# Patient Record
Sex: MALE | Race: BLACK OR AFRICAN AMERICAN | HISPANIC OR LATINO | Employment: FULL TIME | URBAN - METROPOLITAN AREA
[De-identification: names, ages, dates, MRNs, and addresses within clinical notes are randomized per-mention and may not be internally consistent; named-entity substitution may affect disease eponyms.]

---

## 2023-11-08 ENCOUNTER — HOSPITAL ENCOUNTER (INPATIENT)
Facility: HOSPITAL | Age: 43
LOS: 1 days | Discharge: ANOTHER HOSPITAL | DRG: 004 | End: 2023-11-08
Attending: EMERGENCY MEDICINE | Admitting: INTERNAL MEDICINE
Payer: COMMERCIAL

## 2023-11-08 ENCOUNTER — HOSPITAL ENCOUNTER (INPATIENT)
Facility: HOSPITAL | Age: 43
LOS: 4 days | Discharge: OTHER INSTITUTION | DRG: 915 | End: 2023-11-12
Attending: STUDENT IN AN ORGANIZED HEALTH CARE EDUCATION/TRAINING PROGRAM | Admitting: INTERNAL MEDICINE
Payer: COMMERCIAL

## 2023-11-08 ENCOUNTER — APPOINTMENT (OUTPATIENT)
Dept: RADIOLOGY | Facility: HOSPITAL | Age: 43
DRG: 915 | End: 2023-11-08
Payer: COMMERCIAL

## 2023-11-08 ENCOUNTER — ANESTHESIA EVENT (OUTPATIENT)
Dept: OPERATING ROOM | Facility: HOSPITAL | Age: 43
DRG: 915 | End: 2023-11-08
Payer: COMMERCIAL

## 2023-11-08 ENCOUNTER — APPOINTMENT (OUTPATIENT)
Dept: RADIOLOGY | Facility: HOSPITAL | Age: 43
DRG: 004 | End: 2023-11-08
Payer: COMMERCIAL

## 2023-11-08 ENCOUNTER — HOSPITAL ENCOUNTER (OUTPATIENT)
Dept: CARDIOLOGY | Facility: HOSPITAL | Age: 43
Discharge: HOME | End: 2023-11-08
Payer: COMMERCIAL

## 2023-11-08 ENCOUNTER — ANESTHESIA (OUTPATIENT)
Dept: OPERATING ROOM | Facility: HOSPITAL | Age: 43
DRG: 915 | End: 2023-11-08
Payer: COMMERCIAL

## 2023-11-08 VITALS
HEIGHT: 72 IN | OXYGEN SATURATION: 99 % | TEMPERATURE: 96.8 F | SYSTOLIC BLOOD PRESSURE: 137 MMHG | HEART RATE: 116 BPM | DIASTOLIC BLOOD PRESSURE: 77 MMHG | WEIGHT: 227.51 LBS | BODY MASS INDEX: 30.82 KG/M2 | RESPIRATION RATE: 20 BRPM

## 2023-11-08 DIAGNOSIS — J96.02 ACUTE RESPIRATORY FAILURE WITH HYPOXIA AND HYPERCAPNIA (MULTI): Primary | ICD-10-CM

## 2023-11-08 DIAGNOSIS — T78.3XXA ANGIOEDEMA, INITIAL ENCOUNTER: Primary | ICD-10-CM

## 2023-11-08 DIAGNOSIS — J96.01 ACUTE RESPIRATORY FAILURE WITH HYPOXIA AND HYPERCAPNIA (MULTI): Primary | ICD-10-CM

## 2023-11-08 DIAGNOSIS — T78.3XXA ANGIOEDEMA, INITIAL ENCOUNTER: ICD-10-CM

## 2023-11-08 DIAGNOSIS — I10 PRIMARY HYPERTENSION: ICD-10-CM

## 2023-11-08 PROBLEM — J69.0 ASPIRATION PNEUMONIA (MULTI): Status: ACTIVE | Noted: 2023-11-08

## 2023-11-08 PROBLEM — E87.29 RESPIRATORY ACIDOSIS: Status: ACTIVE | Noted: 2023-11-08

## 2023-11-08 LAB
ABO GROUP (TYPE) IN BLOOD: NORMAL
ABO GROUP (TYPE) IN BLOOD: NORMAL
ALBUMIN SERPL BCP-MCNC: 3.9 G/DL (ref 3.4–5)
ALBUMIN SERPL BCP-MCNC: 4 G/DL (ref 3.4–5)
ALBUMIN SERPL BCP-MCNC: 4.1 G/DL (ref 3.4–5)
ALBUMIN SERPL BCP-MCNC: 4.3 G/DL (ref 3.4–5)
ALP SERPL-CCNC: 41 U/L (ref 33–120)
ALP SERPL-CCNC: 44 U/L (ref 33–120)
ALT SERPL W P-5'-P-CCNC: 22 U/L (ref 10–52)
ALT SERPL W P-5'-P-CCNC: 23 U/L (ref 10–52)
ANION GAP BLDV CALCULATED.4IONS-SCNC: 6 MMOL/L (ref 10–25)
ANION GAP BLDV CALCULATED.4IONS-SCNC: 6 MMOL/L (ref 10–25)
ANION GAP SERPL CALC-SCNC: 13 MMOL/L (ref 10–20)
ANION GAP SERPL CALC-SCNC: 15 MMOL/L (ref 10–20)
ANION GAP SERPL CALC-SCNC: 16 MMOL/L (ref 10–20)
ANION GAP SERPL CALC-SCNC: 9 MMOL/L (ref 10–20)
ANTIBODY SCREEN: NORMAL
ANTIBODY SCREEN: NORMAL
APTT PPP: 26 SECONDS (ref 27–38)
AST SERPL W P-5'-P-CCNC: 22 U/L (ref 9–39)
AST SERPL W P-5'-P-CCNC: 22 U/L (ref 9–39)
BASE EXCESS BLDA CALC-SCNC: 1.6 MMOL/L (ref -2–3)
BASE EXCESS BLDV CALC-SCNC: -1.7 MMOL/L (ref -2–3)
BASE EXCESS BLDV CALC-SCNC: 0.1 MMOL/L (ref -2–3)
BASOPHILS # BLD AUTO: 0.03 X10*3/UL (ref 0–0.1)
BASOPHILS NFR BLD AUTO: 0.2 %
BILIRUB SERPL-MCNC: 0.5 MG/DL (ref 0–1.2)
BILIRUB SERPL-MCNC: 0.5 MG/DL (ref 0–1.2)
BODY TEMPERATURE: 37 DEGREES CELSIUS
BUN SERPL-MCNC: 16 MG/DL (ref 6–23)
BUN SERPL-MCNC: 16 MG/DL (ref 6–23)
BUN SERPL-MCNC: 17 MG/DL (ref 6–23)
BUN SERPL-MCNC: 18 MG/DL (ref 6–23)
CA-I BLDV-SCNC: 1.09 MMOL/L (ref 1.1–1.33)
CA-I BLDV-SCNC: 1.11 MMOL/L (ref 1.1–1.33)
CALCIUM SERPL-MCNC: 8.5 MG/DL (ref 8.6–10.6)
CALCIUM SERPL-MCNC: 8.5 MG/DL (ref 8.6–10.6)
CALCIUM SERPL-MCNC: 8.6 MG/DL (ref 8.6–10.3)
CALCIUM SERPL-MCNC: 8.6 MG/DL (ref 8.6–10.6)
CHLORIDE BLDV-SCNC: 104 MMOL/L (ref 98–107)
CHLORIDE BLDV-SCNC: 104 MMOL/L (ref 98–107)
CHLORIDE SERPL-SCNC: 104 MMOL/L (ref 98–107)
CHLORIDE SERPL-SCNC: 105 MMOL/L (ref 98–107)
CHOLEST SERPL-MCNC: 200 MG/DL (ref 0–199)
CHOLESTEROL/HDL RATIO: 3.3
CK MB CFR SERPL CALC: 1 %MB OF CK
CK MB SERPL-CCNC: 3.2 NG/ML
CK SERPL-CCNC: 277 U/L (ref 0–325)
CO2 SERPL-SCNC: 24 MMOL/L (ref 21–32)
CO2 SERPL-SCNC: 25 MMOL/L (ref 21–32)
CO2 SERPL-SCNC: 27 MMOL/L (ref 21–32)
CO2 SERPL-SCNC: 27 MMOL/L (ref 21–32)
CREAT SERPL-MCNC: 0.88 MG/DL (ref 0.5–1.3)
CREAT SERPL-MCNC: 1 MG/DL (ref 0.5–1.3)
CREAT SERPL-MCNC: 1.01 MG/DL (ref 0.5–1.3)
CREAT SERPL-MCNC: 1.13 MG/DL (ref 0.5–1.3)
EOSINOPHIL # BLD AUTO: 0.01 X10*3/UL (ref 0–0.7)
EOSINOPHIL NFR BLD AUTO: 0.1 %
ERYTHROCYTE [DISTWIDTH] IN BLOOD BY AUTOMATED COUNT: 13.2 % (ref 11.5–14.5)
ERYTHROCYTE [DISTWIDTH] IN BLOOD BY AUTOMATED COUNT: 13.7 % (ref 11.5–14.5)
EST. AVERAGE GLUCOSE BLD GHB EST-MCNC: 108 MG/DL
GFR SERPL CREATININE-BSD FRML MDRD: 84 ML/MIN/1.73M*2
GFR SERPL CREATININE-BSD FRML MDRD: >90 ML/MIN/1.73M*2
GLUCOSE BLD MANUAL STRIP-MCNC: 126 MG/DL (ref 74–99)
GLUCOSE BLD MANUAL STRIP-MCNC: 138 MG/DL (ref 74–99)
GLUCOSE BLD MANUAL STRIP-MCNC: 144 MG/DL (ref 74–99)
GLUCOSE BLDV-MCNC: 145 MG/DL (ref 74–99)
GLUCOSE BLDV-MCNC: 176 MG/DL (ref 74–99)
GLUCOSE SERPL-MCNC: 111 MG/DL (ref 74–99)
GLUCOSE SERPL-MCNC: 114 MG/DL (ref 74–99)
GLUCOSE SERPL-MCNC: 118 MG/DL (ref 74–99)
GLUCOSE SERPL-MCNC: 139 MG/DL (ref 74–99)
HBA1C MFR BLD: 5.4 %
HCO3 BLDA-SCNC: 29.2 MMOL/L (ref 22–26)
HCO3 BLDV-SCNC: 27.3 MMOL/L (ref 22–26)
HCO3 BLDV-SCNC: 29.5 MMOL/L (ref 22–26)
HCT VFR BLD AUTO: 40.2 % (ref 41–52)
HCT VFR BLD AUTO: 41.8 % (ref 41–52)
HCT VFR BLD EST: 46 % (ref 41–52)
HCT VFR BLD EST: 47 % (ref 41–52)
HDLC SERPL-MCNC: 60 MG/DL
HGB BLD-MCNC: 14.5 G/DL (ref 13.5–17.5)
HGB BLD-MCNC: 15.3 G/DL (ref 13.5–17.5)
HGB BLDV-MCNC: 15.2 G/DL (ref 13.5–17.5)
HGB BLDV-MCNC: 15.7 G/DL (ref 13.5–17.5)
IMM GRANULOCYTES # BLD AUTO: 0.07 X10*3/UL (ref 0–0.7)
IMM GRANULOCYTES NFR BLD AUTO: 0.4 % (ref 0–0.9)
INHALED O2 CONCENTRATION: 100 %
INHALED O2 CONCENTRATION: 100 %
INHALED O2 CONCENTRATION: 60 %
INR PPP: 1 (ref 0.9–1.1)
LACTATE BLDV-SCNC: 1.1 MMOL/L (ref 0.4–2)
LACTATE BLDV-SCNC: 2.1 MMOL/L (ref 0.4–2)
LDLC SERPL CALC-MCNC: 130 MG/DL
LIPASE SERPL-CCNC: 37 U/L (ref 9–82)
LYMPHOCYTES # BLD AUTO: 0.78 X10*3/UL (ref 1.2–4.8)
LYMPHOCYTES NFR BLD AUTO: 4.7 %
MAGNESIUM SERPL-MCNC: 2.12 MG/DL (ref 1.6–2.4)
MCH RBC QN AUTO: 29.4 PG (ref 26–34)
MCH RBC QN AUTO: 29.9 PG (ref 26–34)
MCHC RBC AUTO-ENTMCNC: 36.1 G/DL (ref 32–36)
MCHC RBC AUTO-ENTMCNC: 36.6 G/DL (ref 32–36)
MCV RBC AUTO: 81 FL (ref 80–100)
MCV RBC AUTO: 82 FL (ref 80–100)
MONOCYTES # BLD AUTO: 0.17 X10*3/UL (ref 0.1–1)
MONOCYTES NFR BLD AUTO: 1 %
NEUTROPHILS # BLD AUTO: 15.51 X10*3/UL (ref 1.2–7.7)
NEUTROPHILS NFR BLD AUTO: 93.6 %
NON HDL CHOLESTEROL: 140 MG/DL (ref 0–149)
NRBC BLD-RTO: 0 /100 WBCS (ref 0–0)
NRBC BLD-RTO: 0 /100 WBCS (ref 0–0)
OXYHGB MFR BLDA: 96.6 % (ref 94–98)
OXYHGB MFR BLDV: 87.3 % (ref 45–75)
OXYHGB MFR BLDV: 93.6 % (ref 45–75)
PCO2 BLDA: 58 MM HG (ref 38–42)
PCO2 BLDV: 53 MM HG (ref 41–51)
PCO2 BLDV: 81 MM HG (ref 41–51)
PH BLDA: 7.31 PH (ref 7.38–7.42)
PH BLDV: 7.17 PH (ref 7.33–7.43)
PH BLDV: 7.32 PH (ref 7.33–7.43)
PHOSPHATE SERPL-MCNC: 3.8 MG/DL (ref 2.5–4.9)
PHOSPHATE SERPL-MCNC: 4.5 MG/DL (ref 2.5–4.9)
PLATELET # BLD AUTO: 240 X10*3/UL (ref 150–450)
PLATELET # BLD AUTO: 255 X10*3/UL (ref 150–450)
PO2 BLDA: 330 MM HG (ref 85–95)
PO2 BLDV: 123 MM HG (ref 35–45)
PO2 BLDV: 62 MM HG (ref 35–45)
POTASSIUM BLDV-SCNC: 4.3 MMOL/L (ref 3.5–5.3)
POTASSIUM BLDV-SCNC: 5.7 MMOL/L (ref 3.5–5.3)
POTASSIUM SERPL-SCNC: 3.6 MMOL/L (ref 3.5–5.3)
POTASSIUM SERPL-SCNC: 5.2 MMOL/L (ref 3.5–5.3)
POTASSIUM SERPL-SCNC: 5.6 MMOL/L (ref 3.5–5.3)
POTASSIUM SERPL-SCNC: 5.7 MMOL/L (ref 3.5–5.3)
PROT SERPL-MCNC: 6.9 G/DL (ref 6.4–8.2)
PROT SERPL-MCNC: 6.9 G/DL (ref 6.4–8.2)
PROTHROMBIN TIME: 11.2 SECONDS (ref 9.8–12.8)
RBC # BLD AUTO: 4.94 X10*6/UL (ref 4.5–5.9)
RBC # BLD AUTO: 5.11 X10*6/UL (ref 4.5–5.9)
RH FACTOR (ANTIGEN D): NORMAL
RH FACTOR (ANTIGEN D): NORMAL
SAO2 % BLDA: 100 % (ref 94–100)
SAO2 % BLDV: 90 % (ref 45–75)
SAO2 % BLDV: 95 % (ref 45–75)
SODIUM BLDV-SCNC: 132 MMOL/L (ref 136–145)
SODIUM BLDV-SCNC: 135 MMOL/L (ref 136–145)
SODIUM SERPL-SCNC: 137 MMOL/L (ref 136–145)
SODIUM SERPL-SCNC: 138 MMOL/L (ref 136–145)
SODIUM SERPL-SCNC: 138 MMOL/L (ref 136–145)
SODIUM SERPL-SCNC: 139 MMOL/L (ref 136–145)
TRIGL SERPL-MCNC: 49 MG/DL (ref 0–149)
TSH SERPL-ACNC: 0.72 MIU/L (ref 0.44–3.98)
VLDL: 10 MG/DL (ref 0–40)
WBC # BLD AUTO: 11.4 X10*3/UL (ref 4.4–11.3)
WBC # BLD AUTO: 16.6 X10*3/UL (ref 4.4–11.3)

## 2023-11-08 PROCEDURE — 36415 COLL VENOUS BLD VENIPUNCTURE: CPT | Performed by: EMERGENCY MEDICINE

## 2023-11-08 PROCEDURE — 2500000004 HC RX 250 GENERAL PHARMACY W/ HCPCS (ALT 636 FOR OP/ED): Performed by: EMERGENCY MEDICINE

## 2023-11-08 PROCEDURE — 3700000001 HC GENERAL ANESTHESIA TIME - INITIAL BASE CHARGE: Performed by: OTOLARYNGOLOGY

## 2023-11-08 PROCEDURE — A31623 PR BRONCHOSCOPY,DIAGNOSTIC W BRUSH: Performed by: ANESTHESIOLOGY

## 2023-11-08 PROCEDURE — 94681 O2 UPTK CO2 OUTP % O2 XTRC: CPT

## 2023-11-08 PROCEDURE — 93010 ELECTROCARDIOGRAM REPORT: CPT | Performed by: INTERNAL MEDICINE

## 2023-11-08 PROCEDURE — 71045 X-RAY EXAM CHEST 1 VIEW: CPT | Performed by: RADIOLOGY

## 2023-11-08 PROCEDURE — 86161 COMPLEMENT/FUNCTION ACTIVITY: CPT | Performed by: STUDENT IN AN ORGANIZED HEALTH CARE EDUCATION/TRAINING PROGRAM

## 2023-11-08 PROCEDURE — 84132 ASSAY OF SERUM POTASSIUM: CPT | Performed by: STUDENT IN AN ORGANIZED HEALTH CARE EDUCATION/TRAINING PROGRAM

## 2023-11-08 PROCEDURE — 99239 HOSP IP/OBS DSCHRG MGMT >30: CPT | Performed by: INTERNAL MEDICINE

## 2023-11-08 PROCEDURE — 2500000005 HC RX 250 GENERAL PHARMACY W/O HCPCS

## 2023-11-08 PROCEDURE — 2500000005 HC RX 250 GENERAL PHARMACY W/O HCPCS: Performed by: INTERNAL MEDICINE

## 2023-11-08 PROCEDURE — 99140 ANES COMP EMERGENCY COND: CPT | Performed by: ANESTHESIOLOGY

## 2023-11-08 PROCEDURE — 71045 X-RAY EXAM CHEST 1 VIEW: CPT

## 2023-11-08 PROCEDURE — 2020000001 HC ICU ROOM DAILY

## 2023-11-08 PROCEDURE — 0BP1XFZ REMOVAL OF TRACHEOSTOMY DEVICE FROM TRACHEA, EXTERNAL APPROACH: ICD-10-PCS | Performed by: EMERGENCY MEDICINE

## 2023-11-08 PROCEDURE — 3700000002 HC GENERAL ANESTHESIA TIME - EACH INCREMENTAL 1 MINUTE: Performed by: OTOLARYNGOLOGY

## 2023-11-08 PROCEDURE — 82947 ASSAY GLUCOSE BLOOD QUANT: CPT | Performed by: STUDENT IN AN ORGANIZED HEALTH CARE EDUCATION/TRAINING PROGRAM

## 2023-11-08 PROCEDURE — 31605 EMER TRACHEOSTOMY CTHYR MEM: CPT | Performed by: OTOLARYNGOLOGY

## 2023-11-08 PROCEDURE — 86162 COMPLEMENT TOTAL (CH50): CPT | Performed by: STUDENT IN AN ORGANIZED HEALTH CARE EDUCATION/TRAINING PROGRAM

## 2023-11-08 PROCEDURE — 83735 ASSAY OF MAGNESIUM: CPT | Performed by: STUDENT IN AN ORGANIZED HEALTH CARE EDUCATION/TRAINING PROGRAM

## 2023-11-08 PROCEDURE — 5A1935Z RESPIRATORY VENTILATION, LESS THAN 24 CONSECUTIVE HOURS: ICD-10-PCS | Performed by: EMERGENCY MEDICINE

## 2023-11-08 PROCEDURE — 85025 COMPLETE CBC W/AUTO DIFF WBC: CPT | Performed by: STUDENT IN AN ORGANIZED HEALTH CARE EDUCATION/TRAINING PROGRAM

## 2023-11-08 PROCEDURE — 2500000004 HC RX 250 GENERAL PHARMACY W/ HCPCS (ALT 636 FOR OP/ED): Performed by: INTERNAL MEDICINE

## 2023-11-08 PROCEDURE — 0BH17EZ INSERTION OF ENDOTRACHEAL AIRWAY INTO TRACHEA, VIA NATURAL OR ARTIFICIAL OPENING: ICD-10-PCS | Performed by: EMERGENCY MEDICINE

## 2023-11-08 PROCEDURE — 87899 AGENT NOS ASSAY W/OPTIC: CPT | Mod: PORLAB | Performed by: INTERNAL MEDICINE

## 2023-11-08 PROCEDURE — 80061 LIPID PANEL: CPT

## 2023-11-08 PROCEDURE — 2500000005 HC RX 250 GENERAL PHARMACY W/O HCPCS: Performed by: NURSE ANESTHETIST, CERTIFIED REGISTERED

## 2023-11-08 PROCEDURE — 94002 VENT MGMT INPAT INIT DAY: CPT

## 2023-11-08 PROCEDURE — 85730 THROMBOPLASTIN TIME PARTIAL: CPT | Performed by: STUDENT IN AN ORGANIZED HEALTH CARE EDUCATION/TRAINING PROGRAM

## 2023-11-08 PROCEDURE — 84132 ASSAY OF SERUM POTASSIUM: CPT

## 2023-11-08 PROCEDURE — 84443 ASSAY THYROID STIM HORMONE: CPT

## 2023-11-08 PROCEDURE — 71045 X-RAY EXAM CHEST 1 VIEW: CPT | Mod: FY

## 2023-11-08 PROCEDURE — 96372 THER/PROPH/DIAG INJ SC/IM: CPT | Performed by: STUDENT IN AN ORGANIZED HEALTH CARE EDUCATION/TRAINING PROGRAM

## 2023-11-08 PROCEDURE — 84132 ASSAY OF SERUM POTASSIUM: CPT | Performed by: EMERGENCY MEDICINE

## 2023-11-08 PROCEDURE — 99222 1ST HOSP IP/OBS MODERATE 55: CPT

## 2023-11-08 PROCEDURE — 5A1945Z RESPIRATORY VENTILATION, 24-96 CONSECUTIVE HOURS: ICD-10-PCS | Performed by: OTOLARYNGOLOGY

## 2023-11-08 PROCEDURE — 83036 HEMOGLOBIN GLYCOSYLATED A1C: CPT

## 2023-11-08 PROCEDURE — 83690 ASSAY OF LIPASE: CPT

## 2023-11-08 PROCEDURE — 2500000004 HC RX 250 GENERAL PHARMACY W/ HCPCS (ALT 636 FOR OP/ED)

## 2023-11-08 PROCEDURE — 31622 DX BRONCHOSCOPE/WASH: CPT | Performed by: OTOLARYNGOLOGY

## 2023-11-08 PROCEDURE — 87449 NOS EACH ORGANISM AG IA: CPT | Mod: PORLAB | Performed by: INTERNAL MEDICINE

## 2023-11-08 PROCEDURE — 99291 CRITICAL CARE FIRST HOUR: CPT | Performed by: EMERGENCY MEDICINE

## 2023-11-08 PROCEDURE — A31623 PR BRONCHOSCOPY,DIAGNOSTIC W BRUSH: Performed by: NURSE ANESTHETIST, CERTIFIED REGISTERED

## 2023-11-08 PROCEDURE — 82947 ASSAY GLUCOSE BLOOD QUANT: CPT

## 2023-11-08 PROCEDURE — 2500000004 HC RX 250 GENERAL PHARMACY W/ HCPCS (ALT 636 FOR OP/ED): Performed by: NURSE ANESTHETIST, CERTIFIED REGISTERED

## 2023-11-08 PROCEDURE — 84075 ASSAY ALKALINE PHOSPHATASE: CPT | Performed by: EMERGENCY MEDICINE

## 2023-11-08 PROCEDURE — 96375 TX/PRO/DX INJ NEW DRUG ADDON: CPT | Mod: 59

## 2023-11-08 PROCEDURE — 96374 THER/PROPH/DIAG INJ IV PUSH: CPT | Mod: 59

## 2023-11-08 PROCEDURE — 31500 INSERT EMERGENCY AIRWAY: CPT | Performed by: EMERGENCY MEDICINE

## 2023-11-08 PROCEDURE — 96372 THER/PROPH/DIAG INJ SC/IM: CPT | Mod: 25

## 2023-11-08 PROCEDURE — 85027 COMPLETE CBC AUTOMATED: CPT | Performed by: EMERGENCY MEDICINE

## 2023-11-08 PROCEDURE — 99223 1ST HOSP IP/OBS HIGH 75: CPT | Performed by: INTERNAL MEDICINE

## 2023-11-08 PROCEDURE — 82553 CREATINE MB FRACTION: CPT

## 2023-11-08 PROCEDURE — 93005 ELECTROCARDIOGRAM TRACING: CPT

## 2023-11-08 PROCEDURE — 99291 CRITICAL CARE FIRST HOUR: CPT | Performed by: INTERNAL MEDICINE

## 2023-11-08 PROCEDURE — 96376 TX/PRO/DX INJ SAME DRUG ADON: CPT | Mod: 59

## 2023-11-08 PROCEDURE — P9017 PLASMA 1 DONOR FRZ W/IN 8 HR: HCPCS

## 2023-11-08 PROCEDURE — 99223 1ST HOSP IP/OBS HIGH 75: CPT

## 2023-11-08 PROCEDURE — 3600000007 HC OR TIME - EACH INCREMENTAL 1 MINUTE - PROCEDURE LEVEL TWO: Performed by: OTOLARYNGOLOGY

## 2023-11-08 PROCEDURE — 82550 ASSAY OF CK (CPK): CPT

## 2023-11-08 PROCEDURE — 0BH17EZ INSERTION OF ENDOTRACHEAL AIRWAY INTO TRACHEA, VIA NATURAL OR ARTIFICIAL OPENING: ICD-10-PCS | Performed by: OTOLARYNGOLOGY

## 2023-11-08 PROCEDURE — C9113 INJ PANTOPRAZOLE SODIUM, VIA: HCPCS | Performed by: STUDENT IN AN ORGANIZED HEALTH CARE EDUCATION/TRAINING PROGRAM

## 2023-11-08 PROCEDURE — 36430 TRANSFUSION BLD/BLD COMPNT: CPT

## 2023-11-08 PROCEDURE — 2500000004 HC RX 250 GENERAL PHARMACY W/ HCPCS (ALT 636 FOR OP/ED): Performed by: STUDENT IN AN ORGANIZED HEALTH CARE EDUCATION/TRAINING PROGRAM

## 2023-11-08 PROCEDURE — 3600000002 HC OR TIME - INITIAL BASE CHARGE - PROCEDURE LEVEL TWO: Performed by: OTOLARYNGOLOGY

## 2023-11-08 PROCEDURE — 82805 BLOOD GASES W/O2 SATURATION: CPT | Performed by: INTERNAL MEDICINE

## 2023-11-08 PROCEDURE — 31526 DX LARYNGOSCOPY W/OPER SCOPE: CPT | Performed by: OTOLARYNGOLOGY

## 2023-11-08 PROCEDURE — 86901 BLOOD TYPING SEROLOGIC RH(D): CPT | Performed by: EMERGENCY MEDICINE

## 2023-11-08 PROCEDURE — 86900 BLOOD TYPING SEROLOGIC ABO: CPT | Performed by: STUDENT IN AN ORGANIZED HEALTH CARE EDUCATION/TRAINING PROGRAM

## 2023-11-08 PROCEDURE — 0B113F4 BYPASS TRACHEA TO CUTANEOUS WITH TRACHEOSTOMY DEVICE, PERCUTANEOUS APPROACH: ICD-10-PCS | Performed by: EMERGENCY MEDICINE

## 2023-11-08 RX ORDER — DEXTROSE MONOHYDRATE 100 MG/ML
50 INJECTION, SOLUTION INTRAVENOUS CONTINUOUS
Status: DISCONTINUED | OUTPATIENT
Start: 2023-11-08 | End: 2023-11-08

## 2023-11-08 RX ORDER — PANTOPRAZOLE SODIUM 40 MG/10ML
40 INJECTION, POWDER, LYOPHILIZED, FOR SOLUTION INTRAVENOUS DAILY
Status: DISCONTINUED | OUTPATIENT
Start: 2023-11-08 | End: 2023-11-11

## 2023-11-08 RX ORDER — FENTANYL CITRATE 50 UG/ML
50 INJECTION, SOLUTION INTRAMUSCULAR; INTRAVENOUS ONCE
Status: COMPLETED | OUTPATIENT
Start: 2023-11-08 | End: 2023-11-08

## 2023-11-08 RX ORDER — FENTANYL CITRATE 50 UG/ML
INJECTION, SOLUTION INTRAMUSCULAR; INTRAVENOUS AS NEEDED
Status: DISCONTINUED | OUTPATIENT
Start: 2023-11-08 | End: 2023-11-08

## 2023-11-08 RX ORDER — FENTANYL CITRATE 50 UG/ML
INJECTION, SOLUTION INTRAMUSCULAR; INTRAVENOUS
Status: COMPLETED
Start: 2023-11-08 | End: 2023-11-08

## 2023-11-08 RX ORDER — NEOSTIGMINE METHYLSULFATE 1 MG/ML
INJECTION, SOLUTION INTRAVENOUS AS NEEDED
Status: DISCONTINUED | OUTPATIENT
Start: 2023-11-08 | End: 2023-11-08

## 2023-11-08 RX ORDER — DEXTROSE MONOHYDRATE 100 MG/ML
0.3 INJECTION, SOLUTION INTRAVENOUS ONCE AS NEEDED
Status: DISCONTINUED | OUTPATIENT
Start: 2023-11-08 | End: 2023-11-11

## 2023-11-08 RX ORDER — POLYETHYLENE GLYCOL 3350 17 G/17G
17 POWDER, FOR SOLUTION ORAL DAILY
Status: DISCONTINUED | OUTPATIENT
Start: 2023-11-08 | End: 2023-11-08 | Stop reason: HOSPADM

## 2023-11-08 RX ORDER — ROCURONIUM BROMIDE 10 MG/ML
INJECTION, SOLUTION INTRAVENOUS
Status: COMPLETED
Start: 2023-11-08 | End: 2023-11-08

## 2023-11-08 RX ORDER — PROPOFOL 10 MG/ML
INJECTION, EMULSION INTRAVENOUS
Status: COMPLETED
Start: 2023-11-08 | End: 2023-11-08

## 2023-11-08 RX ORDER — INSULIN LISPRO 100 [IU]/ML
0-5 INJECTION, SOLUTION INTRAVENOUS; SUBCUTANEOUS EVERY 4 HOURS
Status: DISCONTINUED | OUTPATIENT
Start: 2023-11-08 | End: 2023-11-11

## 2023-11-08 RX ORDER — AMPICILLIN AND SULBACTAM 2; 1 G/1; G/1
INJECTION, POWDER, FOR SOLUTION INTRAMUSCULAR; INTRAVENOUS AS NEEDED
Status: DISCONTINUED | OUTPATIENT
Start: 2023-11-08 | End: 2023-11-08

## 2023-11-08 RX ORDER — KETAMINE HYDROCHLORIDE 50 MG/ML
INJECTION, SOLUTION INTRAMUSCULAR; INTRAVENOUS
Status: COMPLETED
Start: 2023-11-08 | End: 2023-11-08

## 2023-11-08 RX ORDER — SUCCINYLCHOLINE CHLORIDE 20 MG/ML
INJECTION INTRAMUSCULAR; INTRAVENOUS
Status: COMPLETED
Start: 2023-11-08 | End: 2023-11-08

## 2023-11-08 RX ORDER — DEXTROSE 50 % IN WATER (D50W) INTRAVENOUS SYRINGE
25
Status: DISCONTINUED | OUTPATIENT
Start: 2023-11-08 | End: 2023-11-11

## 2023-11-08 RX ORDER — FENTANYL CITRATE-0.9 % NACL/PF 10 MCG/ML
0-300 PLASTIC BAG, INJECTION (ML) INTRAVENOUS CONTINUOUS
Status: CANCELLED | OUTPATIENT
Start: 2023-11-08

## 2023-11-08 RX ORDER — ACETAMINOPHEN 650 MG/1
650 SUPPOSITORY RECTAL EVERY 4 HOURS PRN
Status: DISCONTINUED | OUTPATIENT
Start: 2023-11-08 | End: 2023-11-08 | Stop reason: HOSPADM

## 2023-11-08 RX ORDER — MIDAZOLAM HYDROCHLORIDE 1 MG/ML
0-20 INJECTION, SOLUTION INTRAVENOUS CONTINUOUS
Status: DISCONTINUED | OUTPATIENT
Start: 2023-11-08 | End: 2023-11-08 | Stop reason: HOSPADM

## 2023-11-08 RX ORDER — FENTANYL CITRATE-0.9 % NACL/PF 10 MCG/ML
0-300 PLASTIC BAG, INJECTION (ML) INTRAVENOUS CONTINUOUS
Status: DISCONTINUED | OUTPATIENT
Start: 2023-11-08 | End: 2023-11-10

## 2023-11-08 RX ORDER — ICATIBANT 30 MG/3ML
30 INJECTION, SOLUTION SUBCUTANEOUS ONCE
Status: COMPLETED | OUTPATIENT
Start: 2023-11-08 | End: 2023-11-08

## 2023-11-08 RX ORDER — FENTANYL CITRATE 50 UG/ML
100 INJECTION, SOLUTION INTRAMUSCULAR; INTRAVENOUS ONCE
Status: COMPLETED | OUTPATIENT
Start: 2023-11-08 | End: 2023-11-08

## 2023-11-08 RX ORDER — ONDANSETRON HYDROCHLORIDE 2 MG/ML
4 INJECTION, SOLUTION INTRAVENOUS EVERY 6 HOURS PRN
Status: DISCONTINUED | OUTPATIENT
Start: 2023-11-08 | End: 2023-11-08 | Stop reason: HOSPADM

## 2023-11-08 RX ORDER — MIDAZOLAM HYDROCHLORIDE 1 MG/ML
0-20 INJECTION, SOLUTION INTRAVENOUS CONTINUOUS
Status: CANCELLED | OUTPATIENT
Start: 2023-11-08

## 2023-11-08 RX ORDER — DEXAMETHASONE SODIUM PHOSPHATE 100 MG/10ML
10 INJECTION INTRAMUSCULAR; INTRAVENOUS EVERY 8 HOURS
Status: DISCONTINUED | OUTPATIENT
Start: 2023-11-08 | End: 2023-11-08

## 2023-11-08 RX ORDER — ACETAMINOPHEN 325 MG/1
650 TABLET ORAL EVERY 4 HOURS PRN
Status: DISCONTINUED | OUTPATIENT
Start: 2023-11-08 | End: 2023-11-08 | Stop reason: HOSPADM

## 2023-11-08 RX ORDER — DEXTROSE 50 % IN WATER (D50W) INTRAVENOUS SYRINGE
25 ONCE
Status: DISCONTINUED | OUTPATIENT
Start: 2023-11-08 | End: 2023-11-09

## 2023-11-08 RX ORDER — CISATRACURIUM BESYLATE 2 MG/ML
0.15 INJECTION, SOLUTION INTRAVENOUS ONCE
Status: COMPLETED | OUTPATIENT
Start: 2023-11-08 | End: 2023-11-08

## 2023-11-08 RX ORDER — ACETAMINOPHEN 160 MG/5ML
650 SOLUTION ORAL EVERY 4 HOURS PRN
Status: DISCONTINUED | OUTPATIENT
Start: 2023-11-08 | End: 2023-11-08 | Stop reason: HOSPADM

## 2023-11-08 RX ORDER — DEXTROSE 50 % IN WATER (D50W) INTRAVENOUS SYRINGE
25 ONCE
Status: DISCONTINUED | OUTPATIENT
Start: 2023-11-08 | End: 2023-11-08

## 2023-11-08 RX ORDER — VERAPAMIL HYDROCHLORIDE 2.5 MG/ML
INJECTION, SOLUTION INTRAVENOUS
Status: DISCONTINUED
Start: 2023-11-08 | End: 2023-11-08 | Stop reason: WASHOUT

## 2023-11-08 RX ORDER — DIPHENHYDRAMINE HCL 25 MG
50 CAPSULE ORAL NIGHTLY PRN
Qty: 7 CAPSULE | Refills: 0 | Status: SHIPPED | OUTPATIENT
Start: 2023-11-08 | End: 2023-11-12

## 2023-11-08 RX ORDER — HEPARIN SODIUM 5000 [USP'U]/ML
5000 INJECTION, SOLUTION INTRAVENOUS; SUBCUTANEOUS EVERY 8 HOURS SCHEDULED
Status: DISCONTINUED | OUTPATIENT
Start: 2023-11-08 | End: 2023-11-08 | Stop reason: HOSPADM

## 2023-11-08 RX ORDER — CEFTRIAXONE 1 G/50ML
1 INJECTION, SOLUTION INTRAVENOUS EVERY 24 HOURS
Status: DISCONTINUED | OUTPATIENT
Start: 2023-11-08 | End: 2023-11-08 | Stop reason: HOSPADM

## 2023-11-08 RX ORDER — PROPOFOL 10 MG/ML
INJECTION, EMULSION INTRAVENOUS AS NEEDED
Status: DISCONTINUED | OUTPATIENT
Start: 2023-11-08 | End: 2023-11-08

## 2023-11-08 RX ORDER — ROCURONIUM BROMIDE 10 MG/ML
1 INJECTION, SOLUTION INTRAVENOUS ONCE
Status: COMPLETED | OUTPATIENT
Start: 2023-11-08 | End: 2023-11-08

## 2023-11-08 RX ORDER — DEXAMETHASONE SODIUM PHOSPHATE 100 MG/10ML
INJECTION INTRAMUSCULAR; INTRAVENOUS AS NEEDED
Status: DISCONTINUED | OUTPATIENT
Start: 2023-11-08 | End: 2023-11-08

## 2023-11-08 RX ORDER — PROPOFOL 10 MG/ML
5-50 INJECTION, EMULSION INTRAVENOUS CONTINUOUS
Status: DISCONTINUED | OUTPATIENT
Start: 2023-11-08 | End: 2023-11-10

## 2023-11-08 RX ORDER — PHENYLEPHRINE HCL IN 0.9% NACL 1 MG/10 ML
SYRINGE (ML) INTRAVENOUS AS NEEDED
Status: DISCONTINUED | OUTPATIENT
Start: 2023-11-08 | End: 2023-11-08

## 2023-11-08 RX ORDER — MIDAZOLAM HYDROCHLORIDE 1 MG/ML
0-20 INJECTION, SOLUTION INTRAVENOUS CONTINUOUS
Status: DISCONTINUED | OUTPATIENT
Start: 2023-11-08 | End: 2023-11-08

## 2023-11-08 RX ORDER — GLYCOPYRROLATE 0.2 MG/ML
INJECTION INTRAMUSCULAR; INTRAVENOUS AS NEEDED
Status: DISCONTINUED | OUTPATIENT
Start: 2023-11-08 | End: 2023-11-08

## 2023-11-08 RX ORDER — MIDAZOLAM HYDROCHLORIDE 1 MG/ML
INJECTION, SOLUTION INTRAMUSCULAR; INTRAVENOUS
Status: COMPLETED
Start: 2023-11-08 | End: 2023-11-08

## 2023-11-08 RX ORDER — FAMOTIDINE 20 MG/1
40 TABLET, FILM COATED ORAL 2 TIMES DAILY
Qty: 30 TABLET | Refills: 0 | Status: SHIPPED | OUTPATIENT
Start: 2023-11-08 | End: 2023-11-23

## 2023-11-08 RX ORDER — PREDNISONE 20 MG/1
60 TABLET ORAL DAILY
Qty: 15 TABLET | Refills: 0 | Status: SHIPPED | OUTPATIENT
Start: 2023-11-08 | End: 2023-11-12 | Stop reason: HOSPADM

## 2023-11-08 RX ORDER — KETAMINE HYDROCHLORIDE 50 MG/ML
2 INJECTION, SOLUTION INTRAMUSCULAR; INTRAVENOUS ONCE
Status: COMPLETED | OUTPATIENT
Start: 2023-11-08 | End: 2023-11-08

## 2023-11-08 RX ORDER — PROPOFOL 10 MG/ML
50 INJECTION, EMULSION INTRAVENOUS CONTINUOUS
Status: DISCONTINUED | OUTPATIENT
Start: 2023-11-08 | End: 2023-11-08 | Stop reason: HOSPADM

## 2023-11-08 RX ORDER — MIDAZOLAM HYDROCHLORIDE 1 MG/ML
4 INJECTION, SOLUTION INTRAMUSCULAR; INTRAVENOUS ONCE
Status: COMPLETED | OUTPATIENT
Start: 2023-11-08 | End: 2023-11-08

## 2023-11-08 RX ORDER — LIDOCAINE HYDROCHLORIDE AND EPINEPHRINE 10; 10 MG/ML; UG/ML
INJECTION, SOLUTION INFILTRATION; PERINEURAL
Status: COMPLETED
Start: 2023-11-08 | End: 2023-11-08

## 2023-11-08 RX ORDER — MIDAZOLAM HYDROCHLORIDE 5 MG/ML
INJECTION INTRAMUSCULAR; INTRAVENOUS
Status: COMPLETED
Start: 2023-11-08 | End: 2023-11-08

## 2023-11-08 RX ORDER — DEXAMETHASONE SODIUM PHOSPHATE 100 MG/10ML
6 INJECTION INTRAMUSCULAR; INTRAVENOUS EVERY 6 HOURS
Status: DISCONTINUED | OUTPATIENT
Start: 2023-11-08 | End: 2023-11-11

## 2023-11-08 RX ORDER — HYDRALAZINE HYDROCHLORIDE 20 MG/ML
5 INJECTION INTRAMUSCULAR; INTRAVENOUS EVERY 6 HOURS PRN
Status: DISCONTINUED | OUTPATIENT
Start: 2023-11-08 | End: 2023-11-08 | Stop reason: HOSPADM

## 2023-11-08 RX ORDER — ENOXAPARIN SODIUM 100 MG/ML
40 INJECTION SUBCUTANEOUS DAILY
Status: DISCONTINUED | OUTPATIENT
Start: 2023-11-08 | End: 2023-11-12 | Stop reason: HOSPADM

## 2023-11-08 RX ORDER — ROCURONIUM BROMIDE 10 MG/ML
INJECTION, SOLUTION INTRAVENOUS AS NEEDED
Status: DISCONTINUED | OUTPATIENT
Start: 2023-11-08 | End: 2023-11-08

## 2023-11-08 RX ORDER — MIDAZOLAM HYDROCHLORIDE 5 MG/ML
5 INJECTION INTRAMUSCULAR; INTRAVENOUS ONCE
Status: COMPLETED | OUTPATIENT
Start: 2023-11-08 | End: 2023-11-08

## 2023-11-08 RX ORDER — MIDAZOLAM HYDROCHLORIDE 1 MG/ML
INJECTION INTRAMUSCULAR; INTRAVENOUS AS NEEDED
Status: DISCONTINUED | OUTPATIENT
Start: 2023-11-08 | End: 2023-11-08

## 2023-11-08 RX ADMIN — FENTANYL CITRATE 100 MCG/HR: 50 INJECTION INTRAVENOUS at 07:16

## 2023-11-08 RX ADMIN — PANTOPRAZOLE SODIUM 40 MG: 40 INJECTION, POWDER, FOR SOLUTION INTRAVENOUS at 13:52

## 2023-11-08 RX ADMIN — MIDAZOLAM IN SODIUM CHLORIDE 1 MG/HR: 1 INJECTION INTRAVENOUS at 06:27

## 2023-11-08 RX ADMIN — PROPOFOL 50 MCG/KG/MIN: 10 INJECTION, EMULSION INTRAVENOUS at 06:32

## 2023-11-08 RX ADMIN — SUCCINYLCHOLINE CHLORIDE 100 MG: 20 INJECTION, SOLUTION INTRAMUSCULAR; INTRAVENOUS at 04:12

## 2023-11-08 RX ADMIN — CISATRACURIUM BESYLATE 3 MCG/KG/MIN: 10 INJECTION, SOLUTION INTRAVENOUS at 08:19

## 2023-11-08 RX ADMIN — LIDOCAINE HYDROCHLORIDE,EPINEPHRINE BITARTRATE 10 ML: 10; .01 INJECTION, SOLUTION INFILTRATION; PERINEURAL at 04:10

## 2023-11-08 RX ADMIN — RACEPINEPHRINE HYDROCHLORIDE 0.5 ML: 11.25 SOLUTION RESPIRATORY (INHALATION) at 03:20

## 2023-11-08 RX ADMIN — FENTANYL CITRATE 50 MCG: 50 INJECTION, SOLUTION INTRAMUSCULAR; INTRAVENOUS at 11:39

## 2023-11-08 RX ADMIN — PROPOFOL 50 MG: 10 INJECTION, EMULSION INTRAVENOUS at 12:25

## 2023-11-08 RX ADMIN — FENTANYL CITRATE 50 MCG: 50 INJECTION, SOLUTION INTRAMUSCULAR; INTRAVENOUS at 05:36

## 2023-11-08 RX ADMIN — FENTANYL CITRATE 100 MCG: 50 INJECTION, SOLUTION INTRAMUSCULAR; INTRAVENOUS at 04:34

## 2023-11-08 RX ADMIN — MIDAZOLAM 4 MG: 1 INJECTION INTRAMUSCULAR; INTRAVENOUS at 04:09

## 2023-11-08 RX ADMIN — ICATIBANT 30 MG: 10 INJECTION, SOLUTION SUBCUTANEOUS at 03:36

## 2023-11-08 RX ADMIN — METHYLPREDNISOLONE SODIUM SUCCINATE 125 MG: 125 INJECTION, POWDER, FOR SOLUTION INTRAMUSCULAR; INTRAVENOUS at 03:20

## 2023-11-08 RX ADMIN — ENOXAPARIN SODIUM 40 MG: 100 INJECTION SUBCUTANEOUS at 13:51

## 2023-11-08 RX ADMIN — ROCURONIUM BROMIDE 100 MG: 10 INJECTION, SOLUTION INTRAVENOUS at 04:36

## 2023-11-08 RX ADMIN — KETAMINE HYDROCHLORIDE 200 MG: 50 INJECTION, SOLUTION INTRAMUSCULAR; INTRAVENOUS at 03:54

## 2023-11-08 RX ADMIN — SODIUM CHLORIDE, SODIUM LACTATE, POTASSIUM CHLORIDE, AND CALCIUM CHLORIDE: 600; 310; 30; 20 INJECTION, SOLUTION INTRAVENOUS at 11:28

## 2023-11-08 RX ADMIN — AMPICILLIN AND SULBACTAM 3 G: 2; 1 INJECTION, POWDER, FOR SOLUTION INTRAMUSCULAR; INTRAVENOUS at 11:35

## 2023-11-08 RX ADMIN — DEXAMETHASONE SODIUM PHOSPHATE 10 MG: 10 INJECTION INTRAMUSCULAR; INTRAVENOUS at 13:50

## 2023-11-08 RX ADMIN — MIDAZOLAM HYDROCHLORIDE 4 MG: 1 INJECTION, SOLUTION INTRAMUSCULAR; INTRAVENOUS at 04:09

## 2023-11-08 RX ADMIN — PROPOFOL 10 MCG/KG/MIN: 10 INJECTION, EMULSION INTRAVENOUS at 13:50

## 2023-11-08 RX ADMIN — AMPICILLIN SODIUM AND SULBACTAM SODIUM 3 G: 2; 1 INJECTION, POWDER, FOR SOLUTION INTRAMUSCULAR; INTRAVENOUS at 17:23

## 2023-11-08 RX ADMIN — GLYCOPYRROLATE 0.8 MG: 0.2 INJECTION, SOLUTION INTRAMUSCULAR; INTRAVENOUS at 12:30

## 2023-11-08 RX ADMIN — PROPOFOL 40 MCG/KG/MIN: 10 INJECTION, EMULSION INTRAVENOUS at 20:40

## 2023-11-08 RX ADMIN — PROPOFOL 50 MG: 10 INJECTION, EMULSION INTRAVENOUS at 11:20

## 2023-11-08 RX ADMIN — PROPOFOL 5 MCG/KG/MIN: 10 INJECTION, EMULSION INTRAVENOUS at 04:12

## 2023-11-08 RX ADMIN — PROPOFOL 50 MG: 10 INJECTION, EMULSION INTRAVENOUS at 12:21

## 2023-11-08 RX ADMIN — Medication 100 MCG/HR: at 20:41

## 2023-11-08 RX ADMIN — KETAMINE HYDROCHLORIDE 200 MG: 50 INJECTION, SOLUTION INTRAMUSCULAR; INTRAVENOUS at 04:04

## 2023-11-08 RX ADMIN — Medication 100 MCG/HR: at 12:34

## 2023-11-08 RX ADMIN — Medication 100 PERCENT: at 14:05

## 2023-11-08 RX ADMIN — CISATRACURIUM BESYLATE 15.4 MG: 10 INJECTION INTRAVENOUS at 08:00

## 2023-11-08 RX ADMIN — PROPOFOL 50 MG: 10 INJECTION, EMULSION INTRAVENOUS at 11:45

## 2023-11-08 RX ADMIN — DEXAMETHASONE SODIUM PHOSPHATE 6 MG: 10 INJECTION INTRAMUSCULAR; INTRAVENOUS at 20:25

## 2023-11-08 RX ADMIN — MIDAZOLAM HYDROCHLORIDE 2 MG: 1 INJECTION, SOLUTION INTRAMUSCULAR; INTRAVENOUS at 11:40

## 2023-11-08 RX ADMIN — FENTANYL CITRATE 50 MCG: 50 INJECTION, SOLUTION INTRAMUSCULAR; INTRAVENOUS at 12:25

## 2023-11-08 RX ADMIN — MIDAZOLAM 5 MG: 5 INJECTION INTRAMUSCULAR; INTRAVENOUS at 05:38

## 2023-11-08 RX ADMIN — NEOSTIGMINE METHYLSULFATE 5 MG: 1 INJECTION INTRAVENOUS at 12:30

## 2023-11-08 RX ADMIN — PROPOFOL 40 MCG/KG/MIN: 10 INJECTION, EMULSION INTRAVENOUS at 23:39

## 2023-11-08 RX ADMIN — MIDAZOLAM HYDROCHLORIDE 5 MG: 5 INJECTION INTRAMUSCULAR; INTRAVENOUS at 05:38

## 2023-11-08 RX ADMIN — Medication 100 MCG/HR: at 21:36

## 2023-11-08 RX ADMIN — DEXAMETHASONE SODIUM PHOSPHATE 10 MG: 10 INJECTION INTRAMUSCULAR; INTRAVENOUS at 11:48

## 2023-11-08 RX ADMIN — PROPOFOL 40 MCG/KG/MIN: 10 INJECTION, EMULSION INTRAVENOUS at 18:20

## 2023-11-08 RX ADMIN — FENTANYL CITRATE 25 MCG/HR: 50 INJECTION INTRAVENOUS at 04:55

## 2023-11-08 RX ADMIN — Medication 200 MCG: at 11:35

## 2023-11-08 RX ADMIN — ROCURONIUM BROMIDE 50 MG: 10 INJECTION, SOLUTION INTRAVENOUS at 11:20

## 2023-11-08 SDOH — SOCIAL STABILITY: SOCIAL INSECURITY: DO YOU FEEL ANYONE HAS EXPLOITED OR TAKEN ADVANTAGE OF YOU FINANCIALLY OR OF YOUR PERSONAL PROPERTY?: UNABLE TO ASSESS

## 2023-11-08 SDOH — SOCIAL STABILITY: SOCIAL INSECURITY: ABUSE: ADULT

## 2023-11-08 SDOH — SOCIAL STABILITY: SOCIAL INSECURITY: HAS ANYONE EVER THREATENED TO HURT YOUR FAMILY OR YOUR PETS?: UNABLE TO ASSESS

## 2023-11-08 SDOH — SOCIAL STABILITY: SOCIAL INSECURITY: DO YOU FEEL UNSAFE GOING BACK TO THE PLACE WHERE YOU ARE LIVING?: UNABLE TO ASSESS

## 2023-11-08 SDOH — SOCIAL STABILITY: SOCIAL INSECURITY: DOES ANYONE TRY TO KEEP YOU FROM HAVING/CONTACTING OTHER FRIENDS OR DOING THINGS OUTSIDE YOUR HOME?: UNABLE TO ASSESS

## 2023-11-08 SDOH — SOCIAL STABILITY: SOCIAL INSECURITY: ARE THERE ANY APPARENT SIGNS OF INJURIES/BEHAVIORS THAT COULD BE RELATED TO ABUSE/NEGLECT?: UNABLE TO ASSESS

## 2023-11-08 SDOH — SOCIAL STABILITY: SOCIAL INSECURITY: HAVE YOU HAD THOUGHTS OF HARMING ANYONE ELSE?: UNABLE TO ASSESS

## 2023-11-08 SDOH — SOCIAL STABILITY: SOCIAL INSECURITY: ARE YOU OR HAVE YOU BEEN THREATENED OR ABUSED PHYSICALLY, EMOTIONALLY, OR SEXUALLY BY ANYONE?: UNABLE TO ASSESS

## 2023-11-08 ASSESSMENT — COGNITIVE AND FUNCTIONAL STATUS - GENERAL: PATIENT BASELINE BEDBOUND: UNABLE TO ASSESS AT THIS TIME

## 2023-11-08 ASSESSMENT — COLUMBIA-SUICIDE SEVERITY RATING SCALE - C-SSRS
2. HAVE YOU ACTUALLY HAD ANY THOUGHTS OF KILLING YOURSELF?: NO
1. IN THE PAST MONTH, HAVE YOU WISHED YOU WERE DEAD OR WISHED YOU COULD GO TO SLEEP AND NOT WAKE UP?: NO
6. HAVE YOU EVER DONE ANYTHING, STARTED TO DO ANYTHING, OR PREPARED TO DO ANYTHING TO END YOUR LIFE?: NO

## 2023-11-08 NOTE — ED PROVIDER NOTES
HPI   Chief Complaint   Patient presents with    Medication Reaction     Pt arrives to ED 17 via EMS with a possible medication reaction to his Lisinopril. Patient reports trying to fall asleep and being woke up with difficulty breathing. Patient presents with angioedema. Patient alert, able to speak clear sentences upon arrival. MD at bedside.        Chief complaint: Swelling      History of present illness: Patient is a 42-year-old male presenting to the emergency department with complaints of swelling.  According to the patient, he takes lisinopril for high blood pressure.  The patient states that just prior to his arrival in the emergency department he woke up from sleep with a sensation of swelling in the back of his throat.  The patient states that he called 911.  Prior to his arrival in the emergency department, the patient was given epinephrine and Benadryl with little improvement of his symptoms.  Patient states that he is never had symptoms like this before he denies any itching in his extremities or rash.  The patient states that he has no improvement of his symptoms despite being given medications by EMS.      History provided by:  Patient   used: No                        No data recorded                Patient History   No past medical history on file.  No past surgical history on file.  No family history on file.  Social History     Tobacco Use    Smoking status: Not on file    Smokeless tobacco: Not on file   Substance Use Topics    Alcohol use: Not on file    Drug use: Not on file       Physical Exam   ED Triage Vitals [11/08/23 0315]   Temp Heart Rate Resp BP   37.4 °C (99.4 °F) 66 18 (!) 178/113      SpO2 Temp Source Heart Rate Source Patient Position   97 % Temporal Monitor --      BP Location FiO2 (%)     -- --       Physical Exam  Vitals and nursing note reviewed.   Constitutional:       General: He is not in acute distress.     Appearance: He is well-developed.   HENT:       Head: Normocephalic and atraumatic.      Mouth/Throat:      Lips: Pink.      Mouth: Angioedema present.      Pharynx: Uvula swelling present.      Comments: Patient's soft palate appears like a wall of tissue at the back of the patient's throat meaning the base of the tongue.  The patient's tongue is swollen.  The patient has swelling of the lips.  The patient's face is slightly swollen.  This extends of the patient's eyelids.  Swelling extends to the base of the neck.  Eyes:      General: Allergic shiner present.      Conjunctiva/sclera: Conjunctivae normal.   Neck:      Thyroid: No thyroid mass.   Cardiovascular:      Rate and Rhythm: Normal rate and regular rhythm.      Heart sounds: No murmur heard.  Pulmonary:      Effort: Pulmonary effort is normal. No respiratory distress.      Breath sounds: Normal breath sounds.   Abdominal:      Palpations: Abdomen is soft.      Tenderness: There is no abdominal tenderness.   Musculoskeletal:         General: No swelling.      Cervical back: Neck supple. Edema present.   Skin:     General: Skin is warm and dry.      Capillary Refill: Capillary refill takes less than 2 seconds.   Neurological:      Mental Status: He is alert.   Psychiatric:         Mood and Affect: Mood normal.         ED Course & MDM   Diagnoses as of 11/08/23 0330   Angioedema, initial encounter       Medical Decision Making  On arrival to the emergency department, it was apparent that the patient was in angioedema likely secondary to his lisinopril.  I ordered the patient Solu-Medrol.  The patient informed me that he felt his symptoms were getting worse and as result I ordered the patient icatibant SQ. after this therapy, I observed the patient personally in the room at bedside.  The patient states that his symptoms were feeling better.    I was then informed by the nursing staff and the patient agreed that his symptoms are getting worse.  The patient's voice was becoming more muffled and his tongue was  becoming more swollen as result, it was apparent the patient would require definitive airway given his worsening symptoms.  Looking in the back of the patient's mouth, the patient had extensive edema of his soft palate as result endotracheal intubation would not be an option I therefore opted for nasotracheal intubation.    The patient was given 2 mg/kg of ketamine IV.  After the patient had proper analgesics, I attempted nasal intubation with a laryngoscope and a 7.5 size endotracheal tube.  I first attempted look in the patient's right nares however this was obstructed as result I looked in the patient's left nares.  I was able to identify a swollen glottis however I was able to visualize vocal cords.  I passed to the nasotracheal tube through the patient's vocal cords and then advanced the endotracheal tube however, the patient bucked and while attempting to bag the patient, I heard sounds emanating from the patient's oropharynx.  I was dissatisfied with this placement and as result of this tube was discontinued.  The patient was becoming hypoxic the patient was bag-valve-mask back to a acceptable O2 level.    I attempted nasotracheal intubation again this time with a 6.5 endotracheal tube using the left nares.  This time, there is a copious amount of mucus in the patient's nasopharynx and I was unable to visualize the patient's vocal cords.  Given this second attempt was unsuccessful, and with the patient requiring a emergent airway definitive for oxygenation ventilation, I opted perform a cricothyroidotomy.  I anesthetized the patient's neck with 7 mL of 1% lidocaine with epinephrine.  After this was obtained, I stabilized the patient's trachea with my left hand.  Using an 11 blade, I made a small vertical cut in the patient's anterior neck just over the cricothyroid membrane.  Using the trocar, I punctured the patient's neck.  After what I felt was adequate placement, I aspirated with a syringe using normal  saline and found bubbles verifying placement of the patient's cricothyroidotomy tube.  Trocar was removed and the patient was ventilated.  There was positive color change.    During his time in the emergency department, the patient became more difficult to ventilate.  The patient's bag valve was discontinued and the patient's trach was suctioned.  After this, the patient became easy to bag again.  Patient had gradual improvement of his vital signs.    I ordered 2 units of fresh frozen plasma for the patient.    CBC demonstrated no significant abnormalities Chem-7 and LFTs were all within normal limits.  Blood type is a positive patient's EKG demonstrated a normal sinus rhythm with a rate of 81 bpm isoelectric ST segments narrow QRS complexes and a QTc of 470.  Chest x-ray demonstrated possible multifocal pneumonia as well as emphysema.    Was extremely difficult to provide adequate sedation for the patient.  Patient was started on propofol given fentanyl IV bolus followed by drip, the patient was given Versed.  Eventually, I had to paralyze the patient with rocuronium as he was fighting the vent.  I spoke with the hospitalist who agreed the patient could be admitted to the ICU for further evaluation and therapy.  Patient was then admitted to the ICU and improving condition with a definitive airway.    Amount and/or Complexity of Data Reviewed  Labs: ordered. Decision-making details documented in ED Course.  Radiology: ordered. Decision-making details documented in ED Course.  ECG/medicine tests: ordered and independent interpretation performed.        Procedure  Critical Care    Performed by: Mushtaq Lewis MD  Authorized by: Mushtaq Lewis MD    Critical care provider statement:     Critical care time (minutes):  45    Critical care time was exclusive of:  Separately billable procedures and treating other patients    Critical care was necessary to treat or prevent imminent or life-threatening deterioration of  the following conditions:  Respiratory failure    Critical care was time spent personally by me on the following activities:  Blood draw for specimens, discussions with consultants, evaluation of patient's response to treatment, examination of patient, ordering and performing treatments and interventions, ordering and review of laboratory studies, ordering and review of radiographic studies, pulse oximetry, re-evaluation of patient's condition and ventilator management    I assumed direction of critical care for this patient from another provider in my specialty: no      Care discussed with: admitting provider    Intubation    Performed by: Mushtaq Lewis MD  Authorized by: Mushtaq Lewis MD    Consent:     Consent obtained:  Emergent situation    Risks discussed:  Hypoxia  Universal protocol:     Procedure explained and questions answered to patient or proxy's satisfaction: yes      Relevant documents present and verified: no      Test results available: no      Imaging studies available: no      Required blood products, implants, devices, and special equipment available: yes      Patient identity confirmed:  Verbally with patient  Pre-procedure details:     Indications: airway obstruction, airway protection, respiratory distress and respiratory failure      Patient status:  Awake    Look externally: large tongue      Mouth opening - incisor distance:  Unable to open    Obstruction: edema      Neck mobility: reduced      Pharmacologic strategy: sedation      Induction agents:  Ketamine    Paralytics:  None  Procedure details:     Preoxygenation:  Nonrebreather mask    CPR in progress: no      Number of attempts:  3  Successful intubation attempt details:     Intubation method:  Left nasal    Intubation technique: endoscope assisted      Bougie used: no      Tube size (mm):  7.5    Tube type:  Cuffed    Tube visualized through cords: no    First unsuccessful intubation attempt details:     Intubation method:   Left nasal    Intubation technique:  Endoscope assisted    Bougie used: no      Tube size (mm):  6.5    Tube type:  Cuffed    Ventilation between 1st and 2nd attempt: yes with mask      Tube visualized through cords: no    Second unsuccessful intubation attempt details:     Intubation technique:  Surgical    ETT type: Trach kit.    Ventilation between 2nd and 3rd attempt: yes with mask    Placement assessment:     Tube secured with:  ETT mckay    Breath sounds:  Equal    Placement verification: chest rise, equal breath sounds and numeric ETCO2    Post-procedure details:     Procedure completion:  Tolerated    Complications: hypoxia         Mushtaq Lewis MD  11/08/23 0740

## 2023-11-08 NOTE — CONSULTS
Reason For Consult  Acute respiratory failure, ACEi induced angioedema    History Of Present Illness  Javed Gil is a 42 y.o. male presenting with acute upper airway obstruction from angioedema likely from reaction to ACEi, failed intubation in ED requiring cricothyrotomy done in ED.  He received 1 dose of icatibant and a dose of solumedrol 125mg IV.  He was transferred to MICU and just after the transfer the nurse reports that he started coughing violently despite being on max propofol and versed although the versed IV may have infiltrated and was on fentanyl gtt.  After the coughing he started to desaturate and the surgical resident was called and was at the bedside quickly and increased the size of the neck incision and was able to find that the ETT had dislodged out of the trachea and into the soft tissue.  He was able to then advance the 6.0 ETT back into the trachea and suction some fresh blood out of the ETT.  Then the saturations started to improve and end tidal CO2 dropped from 70 to 50 with sats at 100% on 100% FiO2.       Past Medical History  Asthma  HTN    Surgical History  Hernia surgery    Social History  He has no history on file for tobacco use, alcohol use, and drug use.    Family History  No family history on file.     Allergies  Lisinopril    Review of Systems  Patient unresponsive, intubated and heavily sedated     Physical Exam  GEN: deeply sedated, ETT just reinserted into cric site, 6.0mm ETT secured with tape around neck  HEENT: large tongue obstructing oral cavity, full but not rigid, some crepitus in neck, neck swollen, difficult to feel trachea  CV:  rrr, S1+S2 audible, unable to appreciate murmur, rub or gallop  PULM: breath sounds equal b/l, good air movement, no wheezes, rhonchi or crackles auscultated  ABD: NT/ND, BS+, no masses palpated  EXT: not edematous, 2+ pulses in all extremities, no cyanosis  SKIN: warm, dry  NEURO: deeply sedated     Last Recorded Vitals  Blood  pressure 123/86, pulse 80, temperature 36.2 °C (97.2 °F), resp. rate 20, height 1.829 m (6'), weight 103 kg (227 lb 8.2 oz), SpO2 100 %.    Relevant Results  ABG at 06:21: 7.31/58/330 on 100%, improved from 7.17/81/123 at 04:40     Assessment/Plan     Angioedema likely from ACEi, failed intubation and required emergency cricothyrotomy in ED    -will transfer to [facility] for ENT eval for more definitive airway, proper tracheostomy, we do not have ENT locally  -did receive a dose icatibant in ED with solumedrol 125mg  -patient coughed out tube into soft tissue causing recurrent severe hypoxemia but surgical resident was able to extend incision site and reinsert tube into trachea, will start paralytic (cisatracurium) and continue propofol/versed/fentanyl drips    I spent 60 minutes in the professional and overall care of this patient.    Dominik Fernandez MD  11/8/2023  8:26 AM

## 2023-11-08 NOTE — H&P
"History Of Present Illness  \"Javed Gil is a 42 y.o.  male with HTN, started on lisinopril one year ago, presenting with  presumed ACE-I induced Angioedema. YesterdayHe was trying to fall asleep and awoke with significant difficulty with breathing, shortness of breath and wheezing.      OSH course  While in the ED the patient became increasingly dyspneic, bradycardic and unresponsive with inability to protect his airway.  At that time it was elected by the ED physician to attempt to intubate the patient but upon first attempt the tube was coughed out by the patient and then with increasing edema noted on visualization with glidoscope a crack of cricoidectomy was performed at the bedside by the ED physician successfully.Patient also received icatibant/Solu-Medrol with regards to his angioedema.  Chest x-ray noted bilateral airspace opacities concerning for multifocal pneumonia and emphysematous changes within the soft tissues and visualized neck with linear lucencies extending into the mediastinum suggestive of numerous mediastinum given the recent cricoid.  Despite being on propofol and fentanyl,  patient was restless. While in ICU patient had violent cough leading to displacement of tube  and causing respiratory distress and subcutaneous emphysema. Rapid response was called and critical thyrotomy was extended and placed on new ET tube.  Patient condition as well as saturation improved with new tube placement.  It was then elected to start the patient on a Versed drip and continue him on a fentanyl drip with the goal of removing the propofol drip.  Patient transferred to [facility] for ENT service.     MICU    Was transferred to  MICU, then taken to OR with ENT. ENT noted that angioedema was not severe enough to merit tracheostomy. Patient was orally intubated, cricothyroidotomy site sutured. F/up cxr suggestive of pneumothorax and pneumomedistinum. Fio2 increased to 100%      Past " allergies/medical/surgical/social/family histories unable to be obtained at this time    Review of Systems   Unable to perform ROS: Intubated        Physical Exam  Vitals and nursing note reviewed.   Constitutional:       General: He is in acute distress.      Appearance: He is obese. He is ill-appearing and diaphoretic.      Comments: Notable facial/lip/tongue swelling, with blood around his lips   HENT:      Head: Normocephalic and atraumatic.      Mouth/Throat:      Comments: Swelling/edema of the lips and tongue with inability to visualize posteriorly  Eyes:      Conjunctiva/sclera: Conjunctivae normal.      Pupils: Pupils are equal, round, and reactive to light.   Neck:      Vascular: No carotid bruit.      Comments: S/p cricothyrotomy in ED  Cardiovascular:      Rate and Rhythm: Normal rate and regular rhythm.      Pulses: Normal pulses.      Heart sounds: Normal heart sounds. No murmur heard.     Comments: Difficult to auscultate, thick chest wall  Pulmonary:      Effort: Respiratory distress present.      Breath sounds: Wheezing and rhonchi present.      Comments: Diminished breath sounds throughout with patchy rhonchi throughout, s/p Cricothyrotomy with dried blood around the site   Abdominal:      General: Abdomen is flat. Bowel sounds are normal. There is no distension.      Palpations: Abdomen is soft. There is no mass.      Tenderness: There is no abdominal tenderness.   Musculoskeletal:         General: No swelling, deformity or signs of injury.      Cervical back: Normal range of motion and neck supple.      Right lower leg: No edema.      Left lower leg: No edema.      Comments: Currently intubated and sedation, passive movement in tact   Skin:     General: Skin is warm.      Capillary Refill: Capillary refill takes less than 2 seconds.      Coloration: Skin is not jaundiced.      Findings: No bruising.   Neurological:      Comments: Intubated and sedated, paralyzed   Psychiatric:      Comments:  Intubated/sedated          SCHEDULED MEDICATIONS  ampicillin-sulbactam, 3 g, intravenous, q6h  dexAMETHasone, 10 mg, intravenous, q8h  enoxaparin, 40 mg, subcutaneous, Daily  insulin lispro, 0-5 Units, subcutaneous, q4h  pantoprazole, 40 mg, intravenous, Daily  sodium zirconium cyclosilicate, 10 g, oral, q8h           CONTINUOUS MEDICATIONS  fentaNYL, 0-300 mcg/hr, Last Rate: 100 mcg/hr (11/08/23 1234)  propofol, 5-50 mcg/kg/min, Last Rate: 10 mcg/kg/min (11/08/23 1350)           PRN MEDICATIONS  PRN medications: dextrose 10 % in water (D10W), dextrose, glucagon, oxygen      Last Recorded Vitals  Blood pressure (!) 138/91, pulse 54, temperature 36 °C (96.8 °F), temperature source Temporal, resp. rate 15, SpO2 100 %.    Relevant Results    Lab Results   Component Value Date    WBC 16.6 (H) 11/08/2023    HGB 14.5 11/08/2023    HCT 40.2 (L) 11/08/2023    MCV 81 11/08/2023     11/08/2023     Lab Results   Component Value Date    CREATININE 1.01 11/08/2023    BUN 16 11/08/2023     11/08/2023    K 5.6 (H) 11/08/2023     11/08/2023    CO2 24 11/08/2023     Lab Results   Component Value Date    CALCIUM 8.6 11/08/2023    PHOS 3.8 11/08/2023     Lab Results   Component Value Date    ALT 22 11/08/2023    AST 22 11/08/2023    ALKPHOS 44 11/08/2023    BILITOT 0.5 11/08/2023     Lab Results   Component Value Date    TSH 0.72 11/08/2023       Lab Results   Component Value Date    INR 1.0 11/08/2023    PROTIME 11.2 11/08/2023     Cultures:  No results found for the last 90 days.        XR chest 1 view Result Date: 11/8/2023    1. Bilateral airspace opacities, suggestive of multifocal pneumonia. 2. Emphysema at the soft tissues of the visualized neck with linear lucencies extending along the mediastinum, suggestive of pneumomediastinum.             Assessment/Plan     42 y.o. M who has been on lisinopril for 1 year presented for angioedema s/p icatibant x1, then cricothyrotomy, which was closed by ENT. Now orally  intubated as edema is improving.     Neurologic  #sedation  - sedated on propofol and fentanyl. Versed on standby for breakthrough.   - will maintain deep sedation for 48 hours since ETT will be in for 48 hours    Cardiovascular  #HTN  - will hold home antihypertensives  - started lisinopril a year ago. Allergy is now listed in chart.     #h/of cardiac arrest, unclear history    #Afib  - presumed onset to due catecholamine surge  - HR 60s      HEENT/Pulmonary  #ACE-I induced Angioedema suspected  -unclear when patient was placed on Lisinopril whether recently or in past   -s/p cricothyrotomy performed by the ED physician  -s/p icatibant 1 dose /Solu-Medrol/ FFP x2  -s/p OR with ENT. Cricothyroidotomy site close, intubated for 48 hours  Plan:  - intubation with deep sedation for next 48 hours  - unasyn 3g q6 for 48 hours 11/8 - 9  - dex 10 q8 for airway swelling  - c3/4 and c1 esterase pending  -ent following    #pneumothorax <2cm RUL  #pneumomediastinum   - Likely related to abovementioned events.   - will increase FiO2 to 100% and then repeat cxr in 2 hours   - AM cxr 11/9  - will ctm for need of chest tube    #Airspace Opacities  -suspect in setting of aspiration of blood vs less likely pneumonia  -on unasyn as above    #Acute Hypoxic & Hypercapnic Respiratory Failure, improving s/p Intubation  #Respiratory Acidosis, improving  -RT Eval & Tx Protocol    Gastrointestinal  Will hold off NG or OG for time being given recent angioedema    Renal   #hyperkalemia to 5.7, 5.6  - etiology: received propofol, succinylcholine, no renal impairment, will check CK  - downtrending, good uop  Plan:  - EKG ordered  - Lokelma 10 tid  - f/up CK and lipase    Heme/Onc  N/a    Endo  Low dose ssi with lispro. On dexamethasone for airway swelling    Lab Results   Component Value Date    HGBA1C 5.4 11/08/2023     Lab Results   Component Value Date    CHOL 200 (H) 11/08/2023     Lab Results   Component Value Date    HDL 60.0 11/08/2023  "    Lab Results   Component Value Date    LDLCALC 130 (H) 11/08/2023     Lab Results   Component Value Date    TRIG 49 11/08/2023     No components found for: \"CHOLHDL\"      MSK/Rheum  #h/of back pain, strain  - treated with flexeril, prednisone, tylenol     Infectious Disease  Unasyn as above  Legionella and strep urine antigen pending    Last type and screen: 11/8    F:  prn  E: prn  N: NPO  A: pivs   Drains: catheter 11/8    Bowel regimen: will place when enteral access is obtained  DVT ppx: subcutaneous heparin  GI ppx: iv protonix    Code: Full    Nok is sister Mary Kramer [phone]       Ari Harrison MD    "

## 2023-11-08 NOTE — PROCEDURES
Preoperative HPI    I have reviewed the patient's History and Physical Examination. I have personally seen and evaluated the patient, repeating key portions. There is no significant interval change.     Surgery is still indicated. Yes     Consent reviewed and signed by patient/family: Yes     Operative site verified and marked: site verified as upper airway    Proceed with procedure as planned.     Sergio Jennings, PGY2  Otolaryngology - Head & Neck Surgery  ENT Consult pager: [phone]  Peds pager: [phone]  Adult Head & Neck Phone: [phone]  ENT subspecialty team: Frida individual resident who wrote today's note  Please page if urgent    I am the day consult resident. I can only be contacted from 6am to 5pm M-F. Page on weekends or off hours.

## 2023-11-08 NOTE — SIGNIFICANT EVENT
ENT POST-OP UPDATES    Patient edema was noted to be significantly improved on Direct laryngoscopy. So, decision was made to proceed with transoral intubation without proceeding with formal trach. 6-0 MLT placed and secured at 24 at the teeth and the cricothyroidotomy site closed in 3 layers    -ENT will plan for POD 1 note  -Please leave patient intubated for 48 hours   -Continue with Angioedema cocktail and Unasyn   -Incision care: cleanse with gentle soapy water and apply bacitracin twice a day   -6-0 proximal XLT and wound tray kit left at the bedside for emergency purposes

## 2023-11-08 NOTE — ANESTHESIA POSTPROCEDURE EVALUATION
Patient: Javed Gil    Procedure Summary       Date: 11/08/23 Room / Location: [facility] OR 05 / [facility] [place]    Anesthesia Start: 1115 Anesthesia Stop: 1233    Procedure: Bronchoscopy Flexible Diagnosis:       Angioedema, initial encounter      (Angioedema, initial encounter [T78.3XXA])    Surgeons: James Sherwood MD Responsible Provider: Lisa Valera MD    Anesthesia Type: general ASA Status: 3 - Emergent            Anesthesia Type: general    Vitals Value Taken Time   /82 11/08/23 1238   Temp  11/08/23 1238   Pulse 68 11/08/23 1238   Resp 18 11/08/23 1238   SpO2 100 % 11/08/23 1238   Vitals shown include unvalidated device data.    Anesthesia Post Evaluation    Patient location during evaluation: ICU  Patient participation: complete - patient cannot participate  Post-procedure mental status: sedated.  Pain management: adequate  Airway patency: patent (6.0 MLT)  Cardiovascular status: acceptable, blood pressure returned to baseline, hemodynamically stable and stable  Respiratory status: acceptable, intubated, spontaneous ventilation, ETT and ventilator (VC-AC FiO2 40%, tv 450, rate 18, PEEP 5.  Placed on vent by RT, BBS auscultated)  Hydration status: acceptable        No notable events documented.

## 2023-11-08 NOTE — ANESTHESIA PROCEDURE NOTES
Airway  Date/Time: 11/8/2023 11:50 AM  Urgency: emergent (Pt with malpositioned trach, intubation completed by attending surgeon, Dr. Sherwood)    Airway not difficult    Staffing  Performed: attending   Authorized by: Lisa Valera MD    Performed by: DONTA Chaudhari-CRNA  Patient location during procedure: OR    Consent for Airway (if performed for an anesthetic, see related documentation for consents)  Consent: The procedure was performed in an emergent situation (2 physician signature).  Consent given by: 2 physician emergency consent.      Indications and Patient Condition  Indications for airway management: airway protection, anesthesia and respiratory distress  Spontaneous Ventilation: absent (was already sedated and on paralytics)  Sedation level: deep  Preoxygenated: yes  Mask difficulty assessment: 0 - not attempted (able to ventilate via trach in situ)    Final Airway Details  Final airway type: endotracheal airway      Successful airway: ETT (6.0 MLT used)  Cuffed: yes   Successful intubation technique: direct laryngoscopy (DIDO used by ENT surgical team)  Blade type: DIDO by ENT.  ETT size (mm): 6.0 (6.0 MLT)  Cormack-Lehane Classification: grade I - full view of glottis  Placement verified by: chest auscultation and capnometry   Measured from: teeth (per ENT, MUST be deep to be past the old trach site to not get a leak)  ETT to teeth (cm): 24  Number of attempts at approach: 1    Additional Comments  Pt with 6.5 ETT in trach site, on ventilator, sedated and paralyzed by ICU.  ENT team removed ETT after visualization of the cords with the DIDO.  New 6.0 MLT placed orally by ENT.  +ETCO2 noted with bilateral chest rise and BBS.  **Per ENT attending (Dr. Sherwood), tube MUST remain deep, to allow the tube to be positioned below the trach site in order to prevent a leak** Old trach site being sutured closed.

## 2023-11-08 NOTE — HOSPITAL COURSE
Javed Gil is a 42 y.o.  male with HTN, started on lisinopril one year ago, presenting with  presumed ACE-I induced Angioedema. Yesterday he was trying to fall asleep and awoke with significant difficulty with breathing, shortness of breath and wheezing.       OSH course  While in the ED the patient became increasingly dyspneic, bradycardic and unresponsive with inability to protect his airway.  At that time it was elected by the ED physician to attempt to intubate the patient but upon first attempt the tube was coughed out by the patient and then with increasing edema noted on visualization with glidoscope a crack of cricoidectomy was performed at the bedside by the ED physician successfully.Patient also received icatibant/Solu-Medrol with regards to his angioedema.  Chest x-ray noted bilateral airspace opacities concerning for multifocal pneumonia and emphysematous changes within the soft tissues and visualized neck with linear lucencies extending into the mediastinum suggestive of pneumomediastinum given the recent cricoid.  Despite being on propofol and fentanyl,  patient was restless. While in ICU patient had violent cough leading to displacement of tube  and causing respiratory distress and subcutaneous emphysema. Rapid response was called and critical thyrotomy was extended and placed on new ET tube.  Patient condition as well as saturation improved with new tube placement.  It was then elected to start the patient on a Versed drip and continue him on a fentanyl drip with the goal of removing the propofol drip.  Patient transferred to [facility] for ENT service.      MICU     Was transferred to  MICU, then taken to OR with ENT. ENT noted that angioedema was not severe enough to merit tracheostomy. Patient was orally intubated, cricothyroidotomy site sutured. F/up cxr suggestive of pneumothorax and pneumomedistinum. Fio2 increased to 100%.    Floor:  Was transferred to [facility] team on  11/11.  Patient was seen by EP who recommended outpatient telemetry monitoring for 14 days to assess for pauses.  Patient will receive Zio patch with instructions by mail.  On the floor patient was hypertensive to systolic BPs high 180s, Amlodipine increased to 10mg daily and patient started on Hydralazine 25mg PO TID.  Patient was breathing well post extubation without shortness of breath.  He was medically stable on the floor on 11/12 and discharged home with plan to follow-up with PCP for further management of blood pressure control.

## 2023-11-08 NOTE — SIGNIFICANT EVENT
Attempted to call numbers listed in chart x2, two physician consent obtained for emergency surgery.

## 2023-11-08 NOTE — DISCHARGE SUMMARY
Discharge Diagnosis  Angioedema leading to upper airway obstruction and respiratory failure requiring cricothyrotomy   This discharge took greater than 35 minutes.    Test Results Pending At Discharge  Pending Labs       No current pending labs.            Hospital Course   Patient presented with respiratory distress, in ER he was found to be in respiratory failure with angioedema and upper airway obstruction, failed intubation, underwent emergent catheter ectomy and admitted to ICU.  While in ICU patient had violent cough leading to displacement of tube  and causing respiratory distress and subcutaneous emphysema.  Rapid response was called and critical thyrotomy was extended and placed on new ET tube.  Patient condition as well as saturation improved with new tube placement.  Patient being discharged to higher facility as we do not have ENT service.  Patient been accepted under ENT service at [facility].    Pertinent Physical Exam At Time of Discharge  Physical Exam  Sedated and intubated by chemotherapy  Subcutaneous emphysema  Home Medications     Medication List      ASK your doctor about these medications     diphenhydrAMINE 25 mg capsule; Commonly known as: BENADryl; Take 2   capsules (50 mg) by mouth as needed at bedtime for allergies for up to 4   days.   famotidine 20 mg tablet; Commonly known as: Pepcid; Take 2 tablets (40   mg) by mouth 2 times a day for 15 days.   predniSONE 20 mg tablet; Commonly known as: Deltasone; Take 3 tablets   (60 mg) by mouth once daily for 5 days.       Outpatient Follow-Up  No follow-ups on file.     Sher Marrero MD  11/8/2023  10:44 AM

## 2023-11-08 NOTE — ANESTHESIA PREPROCEDURE EVALUATION
Patient: Javed Gil    Procedure Information       Date/Time: 11/08/23 1041    Procedure: Bronchoscopy Flexible (Bilateral)    Location: [facility] OR 05 / Virtual [facility] OR    Surgeons: James Sherwood MD            Relevant Problems   Anesthesia  Unknown.      Cardiovascular   (+) HTN (hypertension)      Eyes, Ears, Nose, and Throat  Angioedema requiring surgical airway.       Clinical information reviewed:                   NPO Detail:  No data recorded     Physical Exam    Airway  Mallampati: unable to assess     Cardiovascular    Dental    Pulmonary    Abdominal            Anesthesia Plan    ASA 3 - emergent     general     intravenous induction   Postoperative administration of opioids is intended.  Plan/risks discussed with: Sedated patient requires emergent surgery.    Plan discussed with CRNA.

## 2023-11-08 NOTE — PROGRESS NOTES
Emergent rapid response paged overhead with stat page for general surgery resident to present to the ICU.  History provided by patient's nurse: Patient was admitted to ICU overnight after an emergent cricothyroidotomy performed in the emergency department.  Patient at that time had presented to the ED after calling 911 himself for angioedema.  Patient had an emergent airway placed and had normal saturations at that time.  Overnight patient was doing well until early this morning when he started developing respiratory distress and subcutaneous emphysema over his neck and face.  Rapid response was paged for stat evaluation.  Cricothyroid tube dislodged from the trachea and was sitting in the soft tissue.  Respiratory team was unable to pass suction through cricothyroid tube.  Due to declining saturations, lack of end-tidal CO2, and inability to ventilate patient, the decision was made to revise his cricothyroidotomy emergently at bedside.  See procedure note for emergent cricothyroidotomy revision.  Patient tolerated procedure without issue and postprocedure, it was recommended transfer to [facility] for formalization of his emergent airway.  Case was discussed with ICU attending Dr. Fernandez and general surgery attending Dr. Reno.    Marty Yates, PGY4  General Surgery

## 2023-11-08 NOTE — CONSULTS
ENT DEPARTMENT CONSULTATION NOTE  Name: Javed Gil  MRN: [id number]  : 1980  Consulting Team: MICU Dr. Perez  Reason for Consult: unstable cric airway    History of Present Illness  The patient is a 42 y.o. male who presented to [facility] on 2023 as transfer from [place] after presenting with increased dyspnea, bradycardia, and lack of responsiveness and inability to protect his airway.  He was noted to have increasing edema in the setting of an ACE inhibitor.  Patient was then treated with Solu-Medrol for suspected angioedema.  Chest x-ray showed bilateral airspace opacities concerning for multifocal pneumonia.  Patient was attempted to be intubated due to lack of airway protection via glide scope which was unsuccessful and was converted to emergent cricothyrotomy with 6-0 ETT.  At OSH, it was reported the ETT was coughed out and became false passage/decannulated and was emergently reinserted via slightly enlarged cricothyrotomy incision with 6.5 ETT.  Patient was life flighted to [facility] MICU for urgent airway evaluation/management.    On arrival, patient noted to have 6.5 ETT inserted in cricothyrotomy incision and satting in the upper 90s via mobile ventilator.  See initial scope exam below revealing main stenting of ETT.  Decision was made to pursue emergent airway stabilization in the operating room.  Attempts made to contact family which were unsuccessful x2 and documented.  2 physician consent was obtained.  See op report for further details.        Review of Systems  14 point review of systems completed and all negative except as noted in HPI.    Past Medical History  No past medical history on file.    Past Surgical History  No past surgical history on file.    Allergies  Allergies   Allergen Reactions    Lisinopril Angioedema       Medications    Current Facility-Administered Medications:     ampicillin-sulbactam (Unasyn) in sodium chloride 0.9 % 100 mL 3 g, 3 g, intravenous, q6h,  Angela Balderas MD    dexAMETHasone (Decadron) injection 6 mg, 6 mg, intravenous, q6h, Ari Harrison MD    dextrose 10 % in water (D10W) infusion, 0.3 g/kg/hr, intravenous, Once PRN, Ari Harrison MD    insulin regular (HumuLIN R) injection 10 Units, 10 Units, intravenous, Once **FOLLOWED BY** dextrose 50 % injection 25 g, 25 g, intravenous, Once **FOLLOWED BY** dextrose 10 % in water (D10W) infusion, 50 mL/hr, intravenous, Continuous, Ari Harrison MD    dextrose 50 % injection 25 g, 25 g, intravenous, q15 min PRN, Ari Harrison MD    enoxaparin (Lovenox) syringe 40 mg, 40 mg, subcutaneous, Daily, Katey De Luna MD, 40 mg at 11/08/23 1351    fentanyl (Sublimaze) 1000 mcg in sodium chloride 0.9% 100 mL (10 mcg/mL) infusion (premix), 0-300 mcg/hr, intravenous, Continuous, Ari Harrison MD, Last Rate: 10 mL/hr at 11/08/23 1234, 100 mcg/hr at 11/08/23 1234    glucagon (Glucagen) injection 1 mg, 1 mg, intramuscular, q15 min PRN, Ari Harrison MD    insulin lispro (HumaLOG) injection 0-5 Units, 0-5 Units, subcutaneous, q4h, Ari Harrison MD    oxygen (O2) therapy, , inhalation, Continuous PRN - O2/gases, Ari Harrison MD, 100 percent at 11/08/23 1405    pantoprazole (ProtoNix) injection 40 mg, 40 mg, intravenous, Daily, Katey De Luna MD, 40 mg at 11/08/23 1352    propofol (Diprivan) infusion, 5-50 mcg/kg/min, intravenous, Continuous, Katey De Luna MD, Last Rate: 6.18 mL/hr at 11/08/23 1350, 10 mcg/kg/min at 11/08/23 1350    Family History  No family history on file.    Social History  Social History     Socioeconomic History    Marital status: Single     Spouse name: Not on file    Number of children: Not on file    Years of education: Not on file    Highest education level: Not on file   Occupational History    Not on file   Tobacco Use    Smoking status: Not on file    Smokeless tobacco: Not on file   Substance and Sexual Activity    Alcohol use: Not on file    Drug use: Not on file     Sexual activity: Not on file   Other Topics Concern    Not on file   Social History Narrative    Not on file     Social Determinants of Health     Financial Resource Strain: Not on file   Food Insecurity: Not on file   Transportation Needs: Not on file   Physical Activity: Not on file   Stress: Not on file   Social Connections: Not on file   Intimate Partner Violence: Not on file   Housing Stability: Not on file       Vital Signs  Vitals:    11/08/23 1408   BP:    Pulse: 54   Resp: 15   Temp:    SpO2:        Physical Examination  GEN: The patient appears stated age in no acute distress  VOICE: Unable to assess voice due to intubation and sedation  RESP: Airway via cricothyrotomy with 6.5 ET tube  CV: Clinically well perfused   NEURO: Alert and oriented with no focal deficits and CN II-XII symmetric and intact bilaterally  HEAD: Scalp is normocephalic and atraumatic  FACE: No abrasions or lacerations, no maxillary or mandibular stepoffs  EARS: Normal external ears  NOSE: External nose appears normal, no significant septal deviation, anterior rhinoscopy limited with no active bleeding or lesions  OC: Notable facial, lip, and tongue swelling with evidence of dried blood periorally.  OP: Unable to visualize due to sedation bedside  NECK: Trachea midline, no significant lymphadenopathy    Laboratory and Data  Results for orders placed or performed during the hospital encounter of 11/08/23 (from the past 24 hour(s))   CBC and Auto Differential   Result Value Ref Range    WBC 16.6 (H) 4.4 - 11.3 x10*3/uL    nRBC 0.0 0.0 - 0.0 /100 WBCs    RBC 4.94 4.50 - 5.90 x10*6/uL    Hemoglobin 14.5 13.5 - 17.5 g/dL    Hematocrit 40.2 (L) 41.0 - 52.0 %    MCV 81 80 - 100 fL    MCH 29.4 26.0 - 34.0 pg    MCHC 36.1 (H) 32.0 - 36.0 g/dL    RDW 13.2 11.5 - 14.5 %    Platelets 240 150 - 450 x10*3/uL    Neutrophils % 93.6 40.0 - 80.0 %    Immature Granulocytes %, Automated 0.4 0.0 - 0.9 %    Lymphocytes % 4.7 13.0 - 44.0 %    Monocytes %  1.0 2.0 - 10.0 %    Eosinophils % 0.1 0.0 - 6.0 %    Basophils % 0.2 0.0 - 2.0 %    Neutrophils Absolute 15.51 (H) 1.20 - 7.70 x10*3/uL    Immature Granulocytes Absolute, Automated 0.07 0.00 - 0.70 x10*3/uL    Lymphocytes Absolute 0.78 (L) 1.20 - 4.80 x10*3/uL    Monocytes Absolute 0.17 0.10 - 1.00 x10*3/uL    Eosinophils Absolute 0.01 0.00 - 0.70 x10*3/uL    Basophils Absolute 0.03 0.00 - 0.10 x10*3/uL   Comprehensive metabolic panel   Result Value Ref Range    Glucose 139 (H) 74 - 99 mg/dL    Sodium 138 136 - 145 mmol/L    Potassium 5.7 (H) 3.5 - 5.3 mmol/L    Chloride 104 98 - 107 mmol/L    Bicarbonate 25 21 - 32 mmol/L    Anion Gap 15 10 - 20 mmol/L    Urea Nitrogen 18 6 - 23 mg/dL    Creatinine 1.00 0.50 - 1.30 mg/dL    eGFR >90 >60 mL/min/1.73m*2    Calcium 8.5 (L) 8.6 - 10.6 mg/dL    Albumin 3.9 3.4 - 5.0 g/dL    Alkaline Phosphatase 44 33 - 120 U/L    Total Protein 6.9 6.4 - 8.2 g/dL    AST 22 9 - 39 U/L    Bilirubin, Total 0.5 0.0 - 1.2 mg/dL    ALT 22 10 - 52 U/L   Magnesium   Result Value Ref Range    Magnesium 2.12 1.60 - 2.40 mg/dL   Type and screen   Result Value Ref Range    ABO TYPE O     Rh TYPE POS     ANTIBODY SCREEN NEG    Coagulation Screen   Result Value Ref Range    Protime 11.2 9.8 - 12.8 seconds    INR 1.0 0.9 - 1.1    aPTT 26 (L) 27 - 38 seconds   TSH   Result Value Ref Range    Thyroid Stimulating Hormone 0.72 0.44 - 3.98 mIU/L   Hemoglobin A1c   Result Value Ref Range    Hemoglobin A1C 5.4 see below %    Estimated Average Glucose 108 Not Established mg/dL   Blood Gas Venous Full Panel   Result Value Ref Range    POCT pH, Venous 7.32 (L) 7.33 - 7.43 pH    POCT pCO2, Venous 53 (H) 41 - 51 mm Hg    POCT pO2, Venous 62 (H) 35 - 45 mm Hg    POCT SO2, Venous 90 (H) 45 - 75 %    POCT Oxy Hemoglobin, Venous 87.3 (H) 45.0 - 75.0 %    POCT Hematocrit Calculated, Venous 46.0 41.0 - 52.0 %    POCT Sodium, Venous 132 (L) 136 - 145 mmol/L    POCT Potassium, Venous 5.7 (H) 3.5 - 5.3 mmol/L    POCT  Chloride, Venous 104 98 - 107 mmol/L    POCT Ionized Calicum, Venous 1.09 (L) 1.10 - 1.33 mmol/L    POCT Glucose, Venous 145 (H) 74 - 99 mg/dL    POCT Lactate, Venous 2.1 (H) 0.4 - 2.0 mmol/L    POCT Base Excess, Venous 0.1 -2.0 - 3.0 mmol/L    POCT HCO3 Calculated, Venous 27.3 (H) 22.0 - 26.0 mmol/L    POCT Hemoglobin, Venous 15.2 13.5 - 17.5 g/dL    POCT Anion Gap, Venous 6.0 (L) 10.0 - 25.0 mmol/L    Patient Temperature 37.0 degrees Celsius    FiO2 60 %   Renal function panel   Result Value Ref Range    Glucose 111 (H) 74 - 99 mg/dL    Sodium 138 136 - 145 mmol/L    Potassium 5.6 (H) 3.5 - 5.3 mmol/L    Chloride 104 98 - 107 mmol/L    Bicarbonate 24 21 - 32 mmol/L    Anion Gap 16 10 - 20 mmol/L    Urea Nitrogen 16 6 - 23 mg/dL    Creatinine 1.01 0.50 - 1.30 mg/dL    eGFR >90 >60 mL/min/1.73m*2    Calcium 8.6 8.6 - 10.6 mg/dL    Phosphorus 3.8 2.5 - 4.9 mg/dL    Albumin 4.3 3.4 - 5.0 g/dL   Lipid panel   Result Value Ref Range    Cholesterol 200 (H) 0 - 199 mg/dL    HDL-Cholesterol 60.0 mg/dL    Cholesterol/HDL Ratio 3.3     LDL Calculated 130 (H) <=99 mg/dL    VLDL 10 0 - 40 mg/dL    Triglycerides 49 0 - 149 mg/dL    Non HDL Cholesterol 140 0 - 149 mg/dL   Creatine Kinase   Result Value Ref Range    Creatine Kinase 277 0 - 325 U/L   POCT GLUCOSE   Result Value Ref Range    POCT Glucose 138 (H) 74 - 99 mg/dL         PROCEDURE NOTE: Tracheoscopy via cricothyrotomy    For better visualization because of unstable airway, a flexible fiberoptic tracheoscopy was performed. Patient was correctly identified and two-physician emergency consent obtained.  The flexible scope was introduced into the ETT.    The following areas were visualized:  Distal trachea and bela  Findings of probable mainstem of ETT and pooling of blood which was suctioned partially via bronchoscope    Assessment  Javed Gil is a 42 y.o. male who is admitted to OSH, status post cricothyrotomy x2 due to inability to orotracheally intubate.   Patient [place] [facility] [place] urgent airway evaluation status post stabilization of airway in the OR with Dr. Sherwood in conversion back to a oral tracheal ETT.    Patient edema was noted to be significantly improved on Direct laryngoscopy. So, decision was made to proceed with transoral intubation without proceeding with formal trach. 6-0 MLT placed and secured at 24 at the teeth and the cricothyroidotomy site closed in 3 layers     -ENT will plan for POD 1 check  -Please leave patient intubated for 48 hours   -Per ENT attending, ENT does not need to be present for extubation due to lack of significant swelling the operating room via laryngoscope  -Continue with Angioedema cocktail and Unasyn   -Incision care: cleanse with gentle soapy water and apply bacitracin twice a day   -6-0 proximal XLT and wound tray kit left at the bedside for emergency purposes    Note not final until signed by an attending.     Sergio Jennings, PGY2  Otolaryngology - Head & Neck Surgery  ENT Consult pager: [phone]  Peds pager: [phone]  Adult Head & Neck Phone: [phone]  ENT subspecialty team: Frida individual resident who wrote today's note  Please page if urgent    I am the day consult resident. I can only be contacted from 6am to 5pm M-F. Page on weekends or off hours.

## 2023-11-08 NOTE — H&P
History Of Present Illness  Javed Gil is a 42 y.o.  male with a past medical history significant for hypertension. Below information was obtained per discussion with the ED physician and ED Nursing staff. Patient presented with ACE-I induced Angioedema. It is currently unclear if the Lisinopril was newly prescribed or if he has been of the medication for some time.  He had presented to the ED via EMS.  Patient had stated prior to presentation that he was trying to fall asleep and awoke with significant difficulty with breathing, shortness of breath and wheezing.  While in the ED the patient became increasingly dyspneic, bradycardic and unresponsive with inability to protect his airway.  At that time it was elected by the ED physician to attempt to intubate the patient but upon first attempt the tube was coughed out by the patient and then with increasing edema noted on visualization with glide scope a crack of cricoidectomy was performed at the bedside by the ED physician successfully.  The patient was then able to be oxygenated successfully and had noted stabilization of his heart rate and blood pressure. Patient had received rocuronium/Versed/fentanyl/ketamine prior to intubation.  He had also received icatibant/Solu-Medrol with regards to his angioedema.  Since intubation the patient was placed on propofol which was maximized on its dosing and fentanyl.  Upon my arrival to the room patient was notably restless in bed though unresponsive to any commands.  She did withdraw bilateral upper extremities to painful stimuli.  CBC noted a white blood cell count of 11.4.  His metabolic panel was relatively unremarkable except for a slightly elevated glucose of 118 and a BUNs/creatinine of 17/1.13.  His initial VBG was noted with a pH of 7.17, PCO2 of 81, PO2 of 123, SO2 of 95, calculated bicarb of 29.5.  Given the patient was notably restless in bed it was elected to give an additional 5 mg of IV  Versed and 50 mcg of fentanyl patch which showed improvements in his work of breathing on the ventilator as well as his restlessness.  It was then elected to start the patient on a Versed drip and continue him on a fentanyl drip with the goal of removing the propofol drip.  Chest x-ray noted bilateral airspace opacities concerning for multifocal pneumonia and emphysematous changes within the soft tissues and visualized neck with linear lucencies extending into the mediastinum suggestive of numerous mediastinum given the recent cricoid.    A 12 point ROS was performed with the patient denying any complaints at this time aside from those listed in the HPI above.    Past allergies/medical/surgical/social/family histories unable to be obtained at this time    Review of Systems   Unable to perform ROS: Intubated        Physical Exam  Vitals and nursing note reviewed.   Constitutional:       General: He is in acute distress.      Appearance: He is obese. He is ill-appearing and diaphoretic.      Comments: Notable facial/lip/tongue swelling, with blood around his lips   HENT:      Head: Normocephalic and atraumatic.      Mouth/Throat:      Comments: Swelling/edema of the lips and tongue with inability to visualize posteriorly  Eyes:      Conjunctiva/sclera: Conjunctivae normal.      Pupils: Pupils are equal, round, and reactive to light.   Neck:      Vascular: No carotid bruit.      Comments: S/p cricothyrotomy in ED  Cardiovascular:      Rate and Rhythm: Normal rate and regular rhythm.      Pulses: Normal pulses.      Heart sounds: Normal heart sounds. No murmur heard.  Pulmonary:      Effort: Respiratory distress present.      Breath sounds: Wheezing and rhonchi present.      Comments: Diminished breath sounds throughout with patchy rhonchi throughout, s/p Cricothyrotomy with blood oozing around the site   Abdominal:      General: Abdomen is flat. Bowel sounds are normal. There is no distension.      Palpations: Abdomen  is soft. There is no mass.      Tenderness: There is no abdominal tenderness.   Musculoskeletal:         General: No swelling, deformity or signs of injury.      Cervical back: Normal range of motion and neck supple.      Right lower leg: No edema.      Left lower leg: No edema.      Comments: Currently intubated and sedation, passive movement in tact   Skin:     General: Skin is warm.      Capillary Refill: Capillary refill takes less than 2 seconds.      Coloration: Skin is not jaundiced.      Findings: No bruising.   Neurological:      Comments: Intubated and sedated, moving Ues/Les equally bilaterally but non-purposefully, no noted gaze deviation, does withdraw extremities to painful stimuli     Psychiatric:      Comments: Intubated/sedated but restless         SCHEDULED MEDICATIONS  azithromycin, 500 mg, intravenous, q24h  cefTRIAXone, 1 g, intravenous, q24h  heparin (porcine), 5,000 Units, subcutaneous, q8h HYUN  polyethylene glycol, 17 g, oral, Daily           CONTINUOUS MEDICATIONS  fentaNYL, 0-300 mcg/hr, Last Rate: 25 mcg/hr (11/08/23 0455)  midazolam, 0-20 mg/hr, Last Rate: 1 mg/hr (11/08/23 0627)  propofol, 50 mcg/kg/min, Last Rate: 50 mcg/kg/min (11/08/23 0632)           PRN MEDICATIONS  PRN medications: acetaminophen **OR** acetaminophen **OR** acetaminophen, hydrALAZINE, midazolam, ondansetron, oxygen, racepinephrine      Last Recorded Vitals  Blood pressure 123/86, pulse 81, temperature 36.2 °C (97.2 °F), resp. rate 20, height 1.829 m (6'), weight 103 kg (227 lb 8.2 oz), SpO2 99 %.    Relevant Results    Results for orders placed or performed during the hospital encounter of 11/08/23 (from the past 24 hour(s))   CBC   Result Value Ref Range    WBC 11.4 (H) 4.4 - 11.3 x10*3/uL    nRBC 0.0 0.0 - 0.0 /100 WBCs    RBC 5.11 4.50 - 5.90 x10*6/uL    Hemoglobin 15.3 13.5 - 17.5 g/dL    Hematocrit 41.8 41.0 - 52.0 %    MCV 82 80 - 100 fL    MCH 29.9 26.0 - 34.0 pg    MCHC 36.6 (H) 32.0 - 36.0 g/dL    RDW 13.7  11.5 - 14.5 %    Platelets 255 150 - 450 x10*3/uL   Comprehensive metabolic panel   Result Value Ref Range    Glucose 118 (H) 74 - 99 mg/dL    Sodium 137 136 - 145 mmol/L    Potassium 3.6 3.5 - 5.3 mmol/L    Chloride 105 98 - 107 mmol/L    Bicarbonate 27 21 - 32 mmol/L    Anion Gap 9 (L) 10 - 20 mmol/L    Urea Nitrogen 17 6 - 23 mg/dL    Creatinine 1.13 0.50 - 1.30 mg/dL    eGFR 84 >60 mL/min/1.73m*2    Calcium 8.6 8.6 - 10.3 mg/dL    Albumin 4.1 3.4 - 5.0 g/dL    Alkaline Phosphatase 41 33 - 120 U/L    Total Protein 6.9 6.4 - 8.2 g/dL    AST 22 9 - 39 U/L    Bilirubin, Total 0.5 0.0 - 1.2 mg/dL    ALT 23 10 - 52 U/L   Type and Screen   Result Value Ref Range    ABO TYPE O     Rh TYPE POS     ANTIBODY SCREEN NEG    Prepare Plasma: 2 Units   Result Value Ref Range    PRODUCT CODE Q8883P62     Unit Number [id number]     Unit ABO AB     Unit RH POS     Dispense Status TR     Blood Expiration Date November 13, 2023 04:37 EST     PRODUCT BLOOD TYPE 8400     UNIT VOLUME 206     PRODUCT CODE B6498O49     Unit Number [id number]     Unit ABO AB     Unit RH POS     Dispense Status IS     Blood Expiration Date November 13, 2023 04:41 EST     PRODUCT BLOOD TYPE 8400     UNIT VOLUME 204    BLOOD GAS VENOUS FULL PANEL   Result Value Ref Range    POCT pH, Venous 7.17 (LL) 7.33 - 7.43 pH    POCT pCO2, Venous 81 (HH) 41 - 51 mm Hg    POCT pO2, Venous 123 (H) 35 - 45 mm Hg    POCT SO2, Venous 95 (H) 45 - 75 %    POCT Oxy Hemoglobin, Venous 93.6 (H) 45.0 - 75.0 %    POCT Hematocrit Calculated, Venous 47.0 41.0 - 52.0 %    POCT Sodium, Venous 135 (L) 136 - 145 mmol/L    POCT Potassium, Venous 4.3 3.5 - 5.3 mmol/L    POCT Chloride, Venous 104 98 - 107 mmol/L    POCT Ionized Calicum, Venous 1.11 1.10 - 1.33 mmol/L    POCT Glucose, Venous 176 (H) 74 - 99 mg/dL    POCT Lactate, Venous 1.1 0.4 - 2.0 mmol/L    POCT Base Excess, Venous -1.7 -2.0 - 3.0 mmol/L    POCT HCO3 Calculated, Venous 29.5 (H) 22.0 - 26.0 mmol/L    POCT  Hemoglobin, Venous 15.7 13.5 - 17.5 g/dL    POCT Anion Gap, Venous 6.0 (L) 10.0 - 25.0 mmol/L    Patient Temperature 37.0 degrees Celsius    FiO2 100 %   BLOOD GAS ARTERIAL   Result Value Ref Range    POCT pH, Arterial 7.31 (L) 7.38 - 7.42 pH    POCT pCO2, Arterial 58 (H) 38 - 42 mm Hg    POCT pO2, Arterial 330 (H) 85 - 95 mm Hg    POCT SO2, Arterial 100 94 - 100 %    POCT Oxy Hemoglobin, Arterial 96.6 94.0 - 98.0 %    POCT Base Excess, Arterial 1.6 -2.0 - 3.0 mmol/L    POCT HCO3 Calculated, Arterial 29.2 (H) 22.0 - 26.0 mmol/L    Patient Temperature 37.0 degrees Celsius    FiO2 100 %          XR chest 1 view    Result Date: 11/8/2023  Interpreted By:  Tomasa Odom, STUDY: XR CHEST 1 VIEW;  11/8/2023 4:59 am   INDICATION: Signs/Symptoms:hypoxia   COMPARISON: Chest x-ray 09/14/2023.   ACCESSION NUMBER(S): [id number]   ORDERING CLINICIAN: ERUM KAUFMAN   TECHNIQUE: Portable supine frontal view of the chest was obtained .   FINDINGS: Monitoring wires are overlying the patient. There is a right-sided pacemaker pad partially obscuring the right hemithorax.   Magnified cardiac silhouette on portable imaging.   There are bilateral airspace opacities, more confluent at the upper lungs. No sizable pleural effusion or evidence for pneumothorax on supine imaging.   There is lucency at the soft tissues of the visualized neck, suggestive of emphysema. Linear lucencies are extending along the mediastinum.       1. Bilateral airspace opacities, suggestive of multifocal pneumonia. 2. Emphysema at the soft tissues of the visualized neck with linear lucencies extending along the mediastinum, suggestive of pneumomediastinum.       MACRO: None.   Signed by: Tomasa Odom 11/8/2023 5:09 AM Dictation workstation:   [id number]          Assessment/Plan     1.  ACE-I induced Angioedema suspected  -unclear when patient was placed on Lisinopril whether recently or in past   -s/p cricothyrotomy performed by the ED physician  -s/p  icatibant/Solu-Medrol/plasma     2.  Acute Hypoxic & Hypercapnic Respiratory Failure s/p Intubation  -initial VBG: pH of 7.17, PCO2 of 81, PO2 of 123, SO2 of 95, calculated bicarb of 29.5  -repeat ABG about 2hrs post above: pH is 7.31, PCO2 of 58, PO2 of 330, SO2 100, tachycardia to bicarb of 29.2  -RT Eval & Tx Protocol  -Intensivist Consult appreciated  -initially still agitated on max dose of propofol for over an hour and fentanyl infusion  -will start on Versed infusion and continue on fentanyl infusion and wean off propofol accordingly    3.  HTN  -will need to list ACE-I's as a severe allergy   -for now will place on prn Hydralazine 5mg IV q6hrs for sustained SBP >155    4.  Respiratory Acidosis  -likely in setting of above  -steadily improving with respiratory management      5.  Airspace Opacities / Pneumonia?  -suspect in setting of aspiration of blood vs less likely pneumonia  -will place on IV Rocephin/Azithromycin but if notable improvement in oxygen and respiratory then can be de-escalated or discontinued pending re-evaluation          Maury Hernandez, DO

## 2023-11-08 NOTE — PROCEDURES
Procedure note for emergent cricothyroidotomy    Preprocedure diagnosis: Airway obstruction  Postprocedure diagnosis: Airway obstruction  Procedure: Emergent revision of cricothyroidotomy  Resident: Marty Yates, PGY4  Attending: Dr. Reno    Procedure description:  Emergently paged by nursing for rapid response.  Patient noted to have facial swelling with lack of end-tidal CO2 and decreasing oxygen saturation.  Patient was noted to have a cricothyroid tube can get to ventilator.  Bag-valve-mask was unable to provide ventilation.  Cricothyroid tube was found to be dislodged from trachea.  Tube was removed.  Vertical midline neck incision was extended both superiorly and inferiorly with incision carried down to cricothyroid membrane.  Air could be seen bubbling through the wound, likely from prior cricothyroidotomy.  This prior cricothyroidotomy site could not be palpated therefore an incision was created in the cricothyroid membrane using a #15 scalpel.  Back of the blade was advanced through the cricothyroidotomy and turned 90 degrees to dilate the membrane.  6 Vietnamese ET tube was able to be passed successfully through the cricothyroid membrane into the trachea and was connected to the ventilator.  Bronchi and trachea were suctioned via ET tube.  Ventilator read appropriate end-tidal CO2.  Postprocedure patient had normal oxygen saturation normal ventilatory settings.  ET tube was secured in place by respiratory therapy.  Postprocedure chest x-ray was ordered to confirm tube placement.  Patient was recommended for transfer to [facility] for formalization of airway.  Patient tolerated emergent procedure well, and postprocedure had stable vitals.    Marty Yates, PGY4  General Surgery

## 2023-11-08 NOTE — BRIEF OP NOTE
Date: 2023  OR Location: [facility] OR    Name: Javed Gil, : 1980, Age: 42 y.o., MRN: [id number], Sex: male    Diagnosis  Pre-op Diagnosis     * Angioedema, initial encounter [T78.3XXA] Post-op Diagnosis     * Angioedema, initial encounter [T78.3XXA]     Procedures  Bronchoscopy Flexible  13300 - [place] [facility] BRUSHING/PROTECTED BRUSHINGS      Surgeons      * James Sherwood - Primary    Resident/Fellow/Other Assistant:  Surgeon(s) and Role:     * Kristina Ortiz MD - Resident - Assisting    Procedure Summary  Anesthesia: General  ASA: III  Anesthesia Staff: Anesthesiologist: Lisa Valera MD  CRNA: DONTA Chaudhari-CRNA  Estimated Blood Loss: 0mL  Intra-op Medications: * No intraprocedure medications in log *           Anesthesia Record               Intraprocedure I/O Totals          Intake    Propofol Drip 0.00 mL    The total shown is the total volume documented since Anesthesia Start was filed.    Total Intake 0 mL       Output    Urine 300 mL    Est. Blood Loss 2 mL    Total Output 302 mL       Net    Net Volume -302 mL          Specimen: No specimens collected     Staff:   Circulator: Lupe Pierre RN  Scrub Person: Lin Ibrahim          Findings: MDL/B performed with rigid telescope photodocumentation performed. Site of cricothyroidotomy noted in the subglottis--   Expected to continue to improve over next 24-48 hrs. Mild-Moderate upper airway edema, able to intubate with a 6-0 MLT tube secured at 24 cm at the lip, Cricothyroidotomy site sutured closed.     Complications:  None; patient tolerated the procedure well.     Disposition: PACU - hemodynamically stable.  Condition: stable  Specimens Collected: No specimens collected

## 2023-11-08 NOTE — OP NOTE
Bronchoscopy Flexible Operative Note     Date: 2023  OR Location: [facility] OR    Name: Javed Gil, : 1980, Age: 42 y.o., MRN: [id number], Sex: male    Diagnosis  Pre-op Diagnosis     * Angioedema, initial encounter [T78.3XXA] Post-op Diagnosis     * Angioedema, initial encounter [T78.3XXA]     Procedures  Bronchoscopy Flexible  18941 - [place] [facility] BRUSHING/PROTECTED BRUSHINGS      Surgeons      * James Sherwood - Primary    Resident/Fellow/Other Assistant:  Surgeon(s) and Role:     * Kristina Ortiz MD - Resident - Assisting    Procedure Summary  Anesthesia: General  ASA: III  Anesthesia Staff: Anesthesiologist: Lisa Valera MD  CRNA: DONTA Chaudhari-CRNA  Estimated Blood Loss: 0mL  Intra-op Medications: * No intraprocedure medications in log *           Anesthesia Record               Intraprocedure I/O Totals          Intake    Propofol Drip 0.00 mL    The total shown is the total volume documented since Anesthesia Start was filed.    Total Intake 0 mL       Output    Urine 300 mL    Est. Blood Loss 2 mL    Total Output 302 mL       Net    Net Volume -302 mL          Specimen: No specimens collected     Staff:   Circulator: Lupe Pierre RN  Scrub Person: Lin Ibrahim         Drains and/or Catheters:   Urethral Catheter 16 Fr. (Active)   Site Assessment Clean 23 1128   Collection Container Standard drainage bag 23 1128   Securement Method Securing device (Describe) 23 1128   Reason for Continuing Urinary Catheterization accurate hourly measurement of urine volume in a critically ill patient that cannot be assessed by other volumes and urine collection strategies 23 1128   Output (mL) 675 mL 23 0820       Tourniquet Times: None        Implants: None    Findings: Site of cricothyroidotomy noted in the subglottis--expected to heal on its own. Mild-Moderate upper airway edema, able to intubate with a 6-0 MLT tube secured at 24 at the lip,  Cricothyroidotomy site sutured closed.        Indications: Javed Gil is an 42 y.o. male who is having surgery for Angioedema, initial encounter [T78.3XXA].     The patient was seen in the preoperative area. The risks, benefits, complications, treatment options, non-operative alternatives, expected recovery and outcomes were discussed with the patient. The possibilities of reaction to medication, pulmonary aspiration, injury to surrounding structures, bleeding, recurrent infection, the need for additional procedures, failure to diagnose a condition, and creating a complication requiring transfusion or operation were discussed with the patient. The patient concurred with the proposed plan, giving informed consent.  The site of surgery was properly noted/marked if necessary per policy. The patient has been actively warmed in preoperative area. Preoperative antibiotics have been ordered and given within 1 hours of incision. Venous thrombosis prophylaxis have been ordered including bilateral sequential compression devices    Procedure Details:   Indications:   Patient is a 42y.o male with a past medical history of HTN on Lisinopril who presented to [facility] with significant Angioedema. Reported to have difficulty which required intubation.  Had failed attempts at a transoral intubation, so a cricothyroidotomy was performed and a 6-0 ETT was inserted. Due to subsequent dislodgment, another cricothyroidotomy was performed and a 6-5 ETT was placed. He was then transferred to [facility] for ENT evaluation. In the MICU, a scope exam was performed through the ETT and the tube was noted to be mainstemmed with significant bloody drainage within the airway. So, the decision was made to proceed to the OR for MDL/B evaluation, possible transoral intubation or possible tracheostomy revision to prevent long term airway injury from the cricothyrotomy. A two physician consent was performed because we were unable to get in  touch with the patient's family members despite multiple attempts.     Procedure in Detail:   Patient was brought straight down to the OR and was turned 90 degrees towards the ENT team. Time out was performed by Dr. Sherwood.    First, direct laryngoscopy was performed using the [place]ira laryngoscope. All sites of the upper aerodigestive tract were visualized including the uvula, soft palate, tonsils, vallecula, epiglottis, base of tongue, arytenoids, false and true vocal cords, post-cricoid region and pyriform sinuses. There was noted to be moderate edema of the supraglottis and epiglottis, but we had adequate visualization of the posterior glottis, as described in the findings. A zero degree telescope was then inserted through the cords for visualization of the ETT through the cricothyroidotomy site in the subglottis. The tube was subsequently removed under direct visualization and old blood products within the trachea suctioned out. We inspected the external wound site and the internal lumen injury.  Decision was made to intubate the patient to bypass the cricothyrotomy to allow this area to heal and allow for the angioedema to resolve.     A 6-0 MLT tube was then placed over the 0 degree telescope using the seldinger technique and inserted through the cords and into the subglottis. Valsalva maneuver was performed to ensure that the distal tip of the tube was past the cricothyroidotomy site. End tidal CO2 was ensured and the ETT was tied in place at 24 cm at the lip.     Attention was then turned to the cricothyroidotomy stoma. Which was prepped with betadine and draped in the usual sterile fashion and closed in three layers (3-0 vicryl for the muscle and deep dermal layers and 5-0 fast for the skin). Bacitracin was applied.     Dr. Sherwood was present for the critical portions of the procedure.     Disposition: To ICU for 48 hrs monitoring.  Abx and Steroids with anticipated extubation and discharge home soon  after.  Will f/u for airway visualization in my clinic in 3-4 weeks.       Complications:  None; patient tolerated the procedure well.    Disposition: PACU - hemodynamically stable.  Condition: stable         Additional Details: Patient transferred directly back to MICU.

## 2023-11-09 ENCOUNTER — APPOINTMENT (OUTPATIENT)
Dept: RADIOLOGY | Facility: HOSPITAL | Age: 43
DRG: 915 | End: 2023-11-09
Payer: COMMERCIAL

## 2023-11-09 ENCOUNTER — HOSPITAL ENCOUNTER (OUTPATIENT)
Dept: CARDIOLOGY | Facility: HOSPITAL | Age: 43
Discharge: HOME | End: 2023-11-09
Payer: COMMERCIAL

## 2023-11-09 LAB
ALBUMIN SERPL BCP-MCNC: 3.8 G/DL (ref 3.4–5)
ALBUMIN SERPL BCP-MCNC: 3.9 G/DL (ref 3.4–5)
ALBUMIN SERPL BCP-MCNC: 4 G/DL (ref 3.4–5)
ALP SERPL-CCNC: 37 U/L (ref 33–120)
ALT SERPL W P-5'-P-CCNC: 20 U/L (ref 10–52)
ANION GAP SERPL CALC-SCNC: 15 MMOL/L (ref 10–20)
ANION GAP SERPL CALC-SCNC: 15 MMOL/L (ref 10–20)
ANION GAP SERPL CALC-SCNC: 16 MMOL/L (ref 10–20)
APTT PPP: 28 SECONDS (ref 27–38)
AST SERPL W P-5'-P-CCNC: 42 U/L (ref 9–39)
BASOPHILS # BLD AUTO: 0.01 X10*3/UL (ref 0–0.1)
BASOPHILS NFR BLD AUTO: 0.1 %
BILIRUB DIRECT SERPL-MCNC: 0.1 MG/DL (ref 0–0.3)
BILIRUB SERPL-MCNC: 0.6 MG/DL (ref 0–1.2)
BLOOD EXPIRATION DATE: NORMAL
BLOOD EXPIRATION DATE: NORMAL
BUN SERPL-MCNC: 16 MG/DL (ref 6–23)
BUN SERPL-MCNC: 17 MG/DL (ref 6–23)
BUN SERPL-MCNC: 18 MG/DL (ref 6–23)
CALCIUM SERPL-MCNC: 8.2 MG/DL (ref 8.6–10.6)
CALCIUM SERPL-MCNC: 8.5 MG/DL (ref 8.6–10.6)
CALCIUM SERPL-MCNC: 8.8 MG/DL (ref 8.6–10.6)
CHLORIDE SERPL-SCNC: 104 MMOL/L (ref 98–107)
CHLORIDE SERPL-SCNC: 104 MMOL/L (ref 98–107)
CHLORIDE SERPL-SCNC: 105 MMOL/L (ref 98–107)
CHLORIDE UR-SCNC: 29 MMOL/L
CHLORIDE/CREATININE (MMOL/G) IN URINE: 17 MMOL/G CREAT (ref 23–275)
CK MB CFR SERPL CALC: NORMAL %
CK MB SERPL-CCNC: <1 NG/ML
CK SERPL-CCNC: 182 U/L (ref 0–325)
CO2 SERPL-SCNC: 24 MMOL/L (ref 21–32)
CO2 SERPL-SCNC: 25 MMOL/L (ref 21–32)
CO2 SERPL-SCNC: 28 MMOL/L (ref 21–32)
CREAT SERPL-MCNC: 0.8 MG/DL (ref 0.5–1.3)
CREAT SERPL-MCNC: 0.9 MG/DL (ref 0.5–1.3)
CREAT SERPL-MCNC: 0.95 MG/DL (ref 0.5–1.3)
CREAT UR-MCNC: 167.9 MG/DL (ref 20–370)
DISPENSE STATUS: NORMAL
DISPENSE STATUS: NORMAL
EOSINOPHIL # BLD AUTO: 0 X10*3/UL (ref 0–0.7)
EOSINOPHIL NFR BLD AUTO: 0 %
ERYTHROCYTE [DISTWIDTH] IN BLOOD BY AUTOMATED COUNT: 13.2 % (ref 11.5–14.5)
GFR SERPL CREATININE-BSD FRML MDRD: >90 ML/MIN/1.73M*2
GLUCOSE BLD MANUAL STRIP-MCNC: 113 MG/DL (ref 74–99)
GLUCOSE BLD MANUAL STRIP-MCNC: 123 MG/DL (ref 74–99)
GLUCOSE BLD MANUAL STRIP-MCNC: 128 MG/DL (ref 74–99)
GLUCOSE BLD MANUAL STRIP-MCNC: 137 MG/DL (ref 74–99)
GLUCOSE BLD MANUAL STRIP-MCNC: 144 MG/DL (ref 74–99)
GLUCOSE BLD MANUAL STRIP-MCNC: 153 MG/DL (ref 74–99)
GLUCOSE SERPL-MCNC: 117 MG/DL (ref 74–99)
GLUCOSE SERPL-MCNC: 126 MG/DL (ref 74–99)
GLUCOSE SERPL-MCNC: 129 MG/DL (ref 74–99)
HCT VFR BLD AUTO: 37.3 % (ref 41–52)
HGB BLD-MCNC: 13.7 G/DL (ref 13.5–17.5)
IMM GRANULOCYTES # BLD AUTO: 0.1 X10*3/UL (ref 0–0.7)
IMM GRANULOCYTES NFR BLD AUTO: 0.6 % (ref 0–0.9)
INR PPP: 1.1 (ref 0.9–1.1)
LEGIONELLA AG UR QL: NEGATIVE
LYMPHOCYTES # BLD AUTO: 0.94 X10*3/UL (ref 1.2–4.8)
LYMPHOCYTES NFR BLD AUTO: 5.8 %
MAGNESIUM SERPL-MCNC: 2.49 MG/DL (ref 1.6–2.4)
MCH RBC QN AUTO: 29.6 PG (ref 26–34)
MCHC RBC AUTO-ENTMCNC: 36.7 G/DL (ref 32–36)
MCV RBC AUTO: 81 FL (ref 80–100)
MONOCYTES # BLD AUTO: 0.79 X10*3/UL (ref 0.1–1)
MONOCYTES NFR BLD AUTO: 4.9 %
NEUTROPHILS # BLD AUTO: 14.32 X10*3/UL (ref 1.2–7.7)
NEUTROPHILS NFR BLD AUTO: 88.6 %
NRBC BLD-RTO: 0 /100 WBCS (ref 0–0)
PHOSPHATE SERPL-MCNC: 3.8 MG/DL (ref 2.5–4.9)
PHOSPHATE SERPL-MCNC: 4.3 MG/DL (ref 2.5–4.9)
PHOSPHATE SERPL-MCNC: 5.4 MG/DL (ref 2.5–4.9)
PLATELET # BLD AUTO: 234 X10*3/UL (ref 150–450)
POTASSIUM SERPL-SCNC: 4.6 MMOL/L (ref 3.5–5.3)
POTASSIUM SERPL-SCNC: 6 MMOL/L (ref 3.5–5.3)
POTASSIUM SERPL-SCNC: 6.1 MMOL/L (ref 3.5–5.3)
POTASSIUM SERPL-SCNC: 6.3 MMOL/L (ref 3.5–5.3)
POTASSIUM UR-SCNC: 73 MMOL/L
POTASSIUM/CREAT UR-RTO: 43 MMOL/G CREAT
PRODUCT BLOOD TYPE: 8400
PRODUCT BLOOD TYPE: 8400
PRODUCT CODE: NORMAL
PRODUCT CODE: NORMAL
PROT SERPL-MCNC: 6.6 G/DL (ref 6.4–8.2)
PROTHROMBIN TIME: 12.2 SECONDS (ref 9.8–12.8)
RBC # BLD AUTO: 4.63 X10*6/UL (ref 4.5–5.9)
S PNEUM AG UR QL: NEGATIVE
SODIUM SERPL-SCNC: 138 MMOL/L (ref 136–145)
SODIUM SERPL-SCNC: 139 MMOL/L (ref 136–145)
SODIUM SERPL-SCNC: 142 MMOL/L (ref 136–145)
SODIUM UR-SCNC: 18 MMOL/L
SODIUM/CREAT UR-RTO: 11 MMOL/G CREAT
UNIT ABO: NORMAL
UNIT ABO: NORMAL
UNIT NUMBER: NORMAL
UNIT NUMBER: NORMAL
UNIT RH: NORMAL
UNIT RH: NORMAL
UNIT VOLUME: 204
UNIT VOLUME: 206
WBC # BLD AUTO: 16.2 X10*3/UL (ref 4.4–11.3)

## 2023-11-09 PROCEDURE — 80069 RENAL FUNCTION PANEL: CPT | Mod: CCI

## 2023-11-09 PROCEDURE — 85610 PROTHROMBIN TIME: CPT

## 2023-11-09 PROCEDURE — 96372 THER/PROPH/DIAG INJ SC/IM: CPT | Performed by: STUDENT IN AN ORGANIZED HEALTH CARE EDUCATION/TRAINING PROGRAM

## 2023-11-09 PROCEDURE — 93005 ELECTROCARDIOGRAM TRACING: CPT

## 2023-11-09 PROCEDURE — 85025 COMPLETE CBC W/AUTO DIFF WBC: CPT

## 2023-11-09 PROCEDURE — 93010 ELECTROCARDIOGRAM REPORT: CPT | Performed by: INTERNAL MEDICINE

## 2023-11-09 PROCEDURE — 2500000005 HC RX 250 GENERAL PHARMACY W/O HCPCS

## 2023-11-09 PROCEDURE — 80053 COMPREHEN METABOLIC PANEL: CPT

## 2023-11-09 PROCEDURE — 82553 CREATINE MB FRACTION: CPT

## 2023-11-09 PROCEDURE — 2500000004 HC RX 250 GENERAL PHARMACY W/ HCPCS (ALT 636 FOR OP/ED): Performed by: STUDENT IN AN ORGANIZED HEALTH CARE EDUCATION/TRAINING PROGRAM

## 2023-11-09 PROCEDURE — 84132 ASSAY OF SERUM POTASSIUM: CPT

## 2023-11-09 PROCEDURE — C9113 INJ PANTOPRAZOLE SODIUM, VIA: HCPCS | Performed by: STUDENT IN AN ORGANIZED HEALTH CARE EDUCATION/TRAINING PROGRAM

## 2023-11-09 PROCEDURE — 71045 X-RAY EXAM CHEST 1 VIEW: CPT | Performed by: RADIOLOGY

## 2023-11-09 PROCEDURE — 2020000001 HC ICU ROOM DAILY

## 2023-11-09 PROCEDURE — 2500000004 HC RX 250 GENERAL PHARMACY W/ HCPCS (ALT 636 FOR OP/ED)

## 2023-11-09 PROCEDURE — 71045 X-RAY EXAM CHEST 1 VIEW: CPT | Mod: FY

## 2023-11-09 PROCEDURE — 71045 X-RAY EXAM CHEST 1 VIEW: CPT

## 2023-11-09 PROCEDURE — 36415 COLL VENOUS BLD VENIPUNCTURE: CPT

## 2023-11-09 PROCEDURE — 82248 BILIRUBIN DIRECT: CPT

## 2023-11-09 PROCEDURE — 2500000002 HC RX 250 W HCPCS SELF ADMINISTERED DRUGS (ALT 637 FOR MEDICARE OP, ALT 636 FOR OP/ED)

## 2023-11-09 PROCEDURE — 99232 SBSQ HOSP IP/OBS MODERATE 35: CPT

## 2023-11-09 PROCEDURE — 99291 CRITICAL CARE FIRST HOUR: CPT

## 2023-11-09 PROCEDURE — 82947 ASSAY GLUCOSE BLOOD QUANT: CPT

## 2023-11-09 PROCEDURE — 84100 ASSAY OF PHOSPHORUS: CPT

## 2023-11-09 PROCEDURE — 84133 ASSAY OF URINE POTASSIUM: CPT

## 2023-11-09 PROCEDURE — 83735 ASSAY OF MAGNESIUM: CPT

## 2023-11-09 PROCEDURE — 94003 VENT MGMT INPAT SUBQ DAY: CPT

## 2023-11-09 PROCEDURE — 82570 ASSAY OF URINE CREATININE: CPT

## 2023-11-09 PROCEDURE — 82550 ASSAY OF CK (CPK): CPT

## 2023-11-09 RX ORDER — LORAZEPAM 2 MG/ML
INJECTION INTRAMUSCULAR
Status: COMPLETED
Start: 2023-11-09 | End: 2023-11-09

## 2023-11-09 RX ORDER — DEXTROSE 50 % IN WATER (D50W) INTRAVENOUS SYRINGE
25 ONCE
Status: COMPLETED | OUTPATIENT
Start: 2023-11-09 | End: 2023-11-09

## 2023-11-09 RX ORDER — DEXTROSE MONOHYDRATE 100 MG/ML
50 INJECTION, SOLUTION INTRAVENOUS CONTINUOUS
Status: ACTIVE | OUTPATIENT
Start: 2023-11-09 | End: 2023-11-09

## 2023-11-09 RX ORDER — SODIUM CHLORIDE, SODIUM LACTATE, POTASSIUM CHLORIDE, CALCIUM CHLORIDE 600; 310; 30; 20 MG/100ML; MG/100ML; MG/100ML; MG/100ML
999 INJECTION, SOLUTION INTRAVENOUS ONCE
Status: COMPLETED | OUTPATIENT
Start: 2023-11-09 | End: 2023-11-09

## 2023-11-09 RX ORDER — HYDRALAZINE HYDROCHLORIDE 20 MG/ML
INJECTION INTRAMUSCULAR; INTRAVENOUS
Status: COMPLETED
Start: 2023-11-09 | End: 2023-11-10

## 2023-11-09 RX ORDER — DEXTROSE MONOHYDRATE 100 MG/ML
50 INJECTION, SOLUTION INTRAVENOUS CONTINUOUS
Status: DISCONTINUED | OUTPATIENT
Start: 2023-11-09 | End: 2023-11-09

## 2023-11-09 RX ORDER — DEXMEDETOMIDINE HYDROCHLORIDE 4 UG/ML
.1-1.5 INJECTION, SOLUTION INTRAVENOUS CONTINUOUS
Status: DISCONTINUED | OUTPATIENT
Start: 2023-11-09 | End: 2023-11-10

## 2023-11-09 RX ORDER — FUROSEMIDE 10 MG/ML
40 INJECTION INTRAMUSCULAR; INTRAVENOUS ONCE
Status: COMPLETED | OUTPATIENT
Start: 2023-11-09 | End: 2023-11-09

## 2023-11-09 RX ORDER — HYDRALAZINE HYDROCHLORIDE 20 MG/ML
10 INJECTION INTRAMUSCULAR; INTRAVENOUS ONCE
Status: COMPLETED | OUTPATIENT
Start: 2023-11-10 | End: 2023-11-10

## 2023-11-09 RX ORDER — POLYETHYLENE GLYCOL 3350 17 G/17G
17 POWDER, FOR SOLUTION ORAL DAILY
Status: DISCONTINUED | OUTPATIENT
Start: 2023-11-09 | End: 2023-11-11

## 2023-11-09 RX ORDER — MIDAZOLAM HYDROCHLORIDE 1 MG/ML
2 INJECTION INTRAMUSCULAR; INTRAVENOUS EVERY 2 HOUR PRN
Status: DISCONTINUED | OUTPATIENT
Start: 2023-11-09 | End: 2023-11-09

## 2023-11-09 RX ORDER — LORAZEPAM 2 MG/ML
2 INJECTION INTRAMUSCULAR ONCE
Status: COMPLETED | OUTPATIENT
Start: 2023-11-09 | End: 2023-11-09

## 2023-11-09 RX ORDER — ALBUTEROL SULFATE 90 UG/1
AEROSOL, METERED RESPIRATORY (INHALATION)
COMMUNITY

## 2023-11-09 RX ADMIN — DEXAMETHASONE SODIUM PHOSPHATE 6 MG: 10 INJECTION INTRAMUSCULAR; INTRAVENOUS at 13:49

## 2023-11-09 RX ADMIN — PROPOFOL 60 MCG/KG/MIN: 10 INJECTION, EMULSION INTRAVENOUS at 11:35

## 2023-11-09 RX ADMIN — DEXMEDETOMIDINE HYDROCHLORIDE 0.8 MCG/KG/HR: 400 INJECTION INTRAVENOUS at 18:23

## 2023-11-09 RX ADMIN — LORAZEPAM 2 MG: 2 INJECTION INTRAMUSCULAR at 11:36

## 2023-11-09 RX ADMIN — INSULIN LISPRO 1 UNITS: 100 INJECTION, SOLUTION INTRAVENOUS; SUBCUTANEOUS at 16:17

## 2023-11-09 RX ADMIN — AMPICILLIN SODIUM AND SULBACTAM SODIUM 3 G: 2; 1 INJECTION, POWDER, FOR SOLUTION INTRAMUSCULAR; INTRAVENOUS at 00:49

## 2023-11-09 RX ADMIN — Medication 150 MCG/HR: at 03:50

## 2023-11-09 RX ADMIN — DEXAMETHASONE SODIUM PHOSPHATE 6 MG: 10 INJECTION INTRAMUSCULAR; INTRAVENOUS at 07:16

## 2023-11-09 RX ADMIN — DEXAMETHASONE SODIUM PHOSPHATE 6 MG: 10 INJECTION INTRAMUSCULAR; INTRAVENOUS at 20:00

## 2023-11-09 RX ADMIN — DEXMEDETOMIDINE HYDROCHLORIDE 0.8 MCG/KG/HR: 400 INJECTION INTRAVENOUS at 13:44

## 2023-11-09 RX ADMIN — DEXAMETHASONE SODIUM PHOSPHATE 6 MG: 10 INJECTION INTRAMUSCULAR; INTRAVENOUS at 02:44

## 2023-11-09 RX ADMIN — PROPOFOL 50 MCG/KG/MIN: 10 INJECTION, EMULSION INTRAVENOUS at 13:44

## 2023-11-09 RX ADMIN — PROPOFOL 50 MCG/KG/MIN: 10 INJECTION, EMULSION INTRAVENOUS at 04:00

## 2023-11-09 RX ADMIN — FUROSEMIDE 40 MG: 10 INJECTION, SOLUTION INTRAVENOUS at 19:11

## 2023-11-09 RX ADMIN — DEXMEDETOMIDINE HYDROCHLORIDE 0.8 MCG/KG/HR: 400 INJECTION INTRAVENOUS at 23:35

## 2023-11-09 RX ADMIN — PROPOFOL 50 MCG/KG/MIN: 10 INJECTION, EMULSION INTRAVENOUS at 01:00

## 2023-11-09 RX ADMIN — Medication 175 MCG/HR: at 15:02

## 2023-11-09 RX ADMIN — ENOXAPARIN SODIUM 40 MG: 100 INJECTION SUBCUTANEOUS at 08:46

## 2023-11-09 RX ADMIN — INSULIN HUMAN 10 UNITS: 100 INJECTION, SOLUTION PARENTERAL at 17:17

## 2023-11-09 RX ADMIN — SODIUM CHLORIDE, POTASSIUM CHLORIDE, SODIUM LACTATE AND CALCIUM CHLORIDE 999 ML/HR: 600; 310; 30; 20 INJECTION, SOLUTION INTRAVENOUS at 09:50

## 2023-11-09 RX ADMIN — DEXTROSE MONOHYDRATE 25 G: 25 INJECTION, SOLUTION INTRAVENOUS at 07:46

## 2023-11-09 RX ADMIN — INSULIN HUMAN 10 UNITS: 100 INJECTION, SOLUTION PARENTERAL at 07:46

## 2023-11-09 RX ADMIN — Medication 175 MCG/HR: at 20:32

## 2023-11-09 RX ADMIN — AMPICILLIN SODIUM AND SULBACTAM SODIUM 3 G: 2; 1 INJECTION, POWDER, FOR SOLUTION INTRAMUSCULAR; INTRAVENOUS at 11:43

## 2023-11-09 RX ADMIN — PROPOFOL 50 MCG/KG/MIN: 10 INJECTION, EMULSION INTRAVENOUS at 16:49

## 2023-11-09 RX ADMIN — AMPICILLIN SODIUM AND SULBACTAM SODIUM 3 G: 2; 1 INJECTION, POWDER, FOR SOLUTION INTRAMUSCULAR; INTRAVENOUS at 06:01

## 2023-11-09 RX ADMIN — Medication 150 MCG/HR: at 09:42

## 2023-11-09 RX ADMIN — DEXMEDETOMIDINE HYDROCHLORIDE 1 MCG/KG/HR: 400 INJECTION INTRAVENOUS at 11:11

## 2023-11-09 RX ADMIN — MIDAZOLAM HYDROCHLORIDE 2 MG: 1 INJECTION, SOLUTION INTRAMUSCULAR; INTRAVENOUS at 07:16

## 2023-11-09 RX ADMIN — PROPOFOL 30 MCG/KG/MIN: 10 INJECTION, EMULSION INTRAVENOUS at 22:47

## 2023-11-09 RX ADMIN — PROPOFOL 30 MCG/KG/MIN: 10 INJECTION, EMULSION INTRAVENOUS at 19:39

## 2023-11-09 RX ADMIN — AMPICILLIN SODIUM AND SULBACTAM SODIUM 3 G: 2; 1 INJECTION, POWDER, FOR SOLUTION INTRAMUSCULAR; INTRAVENOUS at 17:20

## 2023-11-09 RX ADMIN — DEXTROSE MONOHYDRATE 25 G: 25 INJECTION, SOLUTION INTRAVENOUS at 17:19

## 2023-11-09 RX ADMIN — PROPOFOL 50 MCG/KG/MIN: 10 INJECTION, EMULSION INTRAVENOUS at 07:43

## 2023-11-09 RX ADMIN — PANTOPRAZOLE SODIUM 40 MG: 40 INJECTION, POWDER, FOR SOLUTION INTRAVENOUS at 08:47

## 2023-11-09 RX ADMIN — LORAZEPAM 2 MG: 2 INJECTION INTRAMUSCULAR; INTRAVENOUS at 11:36

## 2023-11-09 SDOH — ECONOMIC STABILITY: INCOME INSECURITY: IN THE PAST 12 MONTHS, HAS THE ELECTRIC, GAS, OIL, OR WATER COMPANY THREATENED TO SHUT OFF SERVICE IN YOUR HOME?: NO

## 2023-11-09 SDOH — ECONOMIC STABILITY: TRANSPORTATION INSECURITY
IN THE PAST 12 MONTHS, HAS LACK OF TRANSPORTATION KEPT YOU FROM MEETINGS, WORK, OR FROM GETTING THINGS NEEDED FOR DAILY LIVING?: NO

## 2023-11-09 SDOH — SOCIAL STABILITY: SOCIAL INSECURITY: WITHIN THE LAST YEAR, HAVE YOU BEEN AFRAID OF YOUR PARTNER OR EX-PARTNER?: NO

## 2023-11-09 SDOH — SOCIAL STABILITY: SOCIAL INSECURITY: WITHIN THE LAST YEAR, HAVE YOU BEEN HUMILIATED OR EMOTIONALLY ABUSED IN OTHER WAYS BY YOUR PARTNER OR EX-PARTNER?: NO

## 2023-11-09 SDOH — ECONOMIC STABILITY: INCOME INSECURITY: HOW HARD IS IT FOR YOU TO PAY FOR THE VERY BASICS LIKE FOOD, HOUSING, MEDICAL CARE, AND HEATING?: NOT HARD AT ALL

## 2023-11-09 SDOH — SOCIAL STABILITY: SOCIAL INSECURITY
WITHIN THE LAST YEAR, HAVE YOU BEEN KICKED, HIT, SLAPPED, OR OTHERWISE PHYSICALLY HURT BY YOUR PARTNER OR EX-PARTNER?: NO

## 2023-11-09 SDOH — ECONOMIC STABILITY: TRANSPORTATION INSECURITY
IN THE PAST 12 MONTHS, HAS THE LACK OF TRANSPORTATION KEPT YOU FROM MEDICAL APPOINTMENTS OR FROM GETTING MEDICATIONS?: NO

## 2023-11-09 SDOH — ECONOMIC STABILITY: FOOD INSECURITY: WITHIN THE PAST 12 MONTHS, YOU WORRIED THAT YOUR FOOD WOULD RUN OUT BEFORE YOU GOT MONEY TO BUY MORE.: NEVER TRUE

## 2023-11-09 SDOH — SOCIAL STABILITY: SOCIAL INSECURITY
WITHIN THE LAST YEAR, HAVE TO BEEN RAPED OR FORCED TO HAVE ANY KIND OF SEXUAL ACTIVITY BY YOUR PARTNER OR EX-PARTNER?: NO

## 2023-11-09 SDOH — ECONOMIC STABILITY: FOOD INSECURITY: WITHIN THE PAST 12 MONTHS, THE FOOD YOU BOUGHT JUST DIDN'T LAST AND YOU DIDN'T HAVE MONEY TO GET MORE.: NEVER TRUE

## 2023-11-09 SDOH — ECONOMIC STABILITY: INCOME INSECURITY: IN THE LAST 12 MONTHS, WAS THERE A TIME WHEN YOU WERE NOT ABLE TO PAY THE MORTGAGE OR RENT ON TIME?: NO

## 2023-11-09 SDOH — ECONOMIC STABILITY: HOUSING INSECURITY
IN THE LAST 12 MONTHS, WAS THERE A TIME WHEN YOU DID NOT HAVE A STEADY PLACE TO SLEEP OR SLEPT IN A SHELTER (INCLUDING NOW)?: NO

## 2023-11-09 SDOH — ECONOMIC STABILITY: HOUSING INSECURITY: IN THE LAST 12 MONTHS, HOW MANY PLACES HAVE YOU LIVED?: 1

## 2023-11-09 ASSESSMENT — PAIN - FUNCTIONAL ASSESSMENT: PAIN_FUNCTIONAL_ASSESSMENT: CPOT (CRITICAL CARE PAIN OBSERVATION TOOL)

## 2023-11-09 ASSESSMENT — ACTIVITIES OF DAILY LIVING (ADL): LACK_OF_TRANSPORTATION: NO

## 2023-11-09 NOTE — PROGRESS NOTES
Javed Gil is a 42 y.o. male on day 1 of admission presenting with Angioedema, initial encounter.    Javed Gil is a 42 y.o.  male with HTN, started on lisinopril one year ago, presenting with  presumed ACE-I induced Angioedema. YesterdayHe was trying to fall asleep and awoke with significant difficulty with breathing, shortness of breath and wheezing.       OSH course  While in the ED the patient became increasingly dyspneic, bradycardic and unresponsive with inability to protect his airway.  At that time it was elected by the ED physician to attempt to intubate the patient but upon first attempt the tube was coughed out by the patient and then with increasing edema noted on visualization with glidoscope,  cricoidectomy was performed at the bedside by the ED physician successfully.Patient also received icatibant/Solu-Medrol with regards to his angioedema.  Chest x-ray noted bilateral airspace opacities concerning for multifocal pneumonia and emphysematous changes within the soft tissues and visualized neck with linear lucencies extending into the mediastinum suggestive of numerous mediastinum given the recent cricoid.  Despite being on propofol and fentanyl,  patient was restless. While in ICU patient had violent cough leading to displacement of tube  and causing respiratory distress and subcutaneous emphysema. Rapid response was called and critical thyrotomy was extended and placed on new ET tube.  Patient condition as well as saturation improved with new tube placement.  It was then elected to start the patient on a Versed drip and continue him on a fentanyl drip with the goal of removing the propofol drip.  Patient transferred to [facility] for ENT service.      MICU     Was transferred to  MICU, then taken to OR with ENT. ENT noted that angioedema was not severe enough to merit tracheostomy. Patient was orally intubated, cricothyroidotomy site sutured. F/up cxr suggestive of  pneumothorax and pneumomedistinum. Fio2 increased to 100%    Subjective   Hyperkalemic, given Iv insulin 10 and D5w  EKG- sinus demetrio, no peaked t waves  Easily arousable - given versed gtt  Hypotensive - given 1L bolus LR         Objective         Last Recorded Vitals  Blood pressure 112/74, pulse 53, temperature 36.6 °C (97.9 °F), temperature source Temporal, resp. rate 17, weight 110 kg (242 lb 15.2 oz), SpO2 99 %.  Intake/Output last 3 Shifts:  I/O last 3 completed shifts:  In: 1166.4 (10.6 mL/kg) [I.V.:616.4 (5.6 mL/kg); IV Piggyback:550]  Out: 3527 (32 mL/kg) [Urine:3525 (0.9 mL/kg/hr); Blood:2]  Weight: 110.2 kg   3 L UOP   Vent Mode: Volume control/assist control  FiO2 (%):  [40 %-100 %] 40 %  S RR:  [15-18] 18  S VT:  [450 mL] 450 mL  PEEP/CPAP (cm H2O):  [5 cm H20] 5 cm H20  MAP (cm H2O):  [9-11] 10      Physical Exam  Constitutional:       Interventions: He is sedated, intubated and restrained.   HENT:      Head:      Comments: Swelling in lower jaw  Lips with dried blood  intubated  Neck:      Comments: Cric incision cdi   Cardiovascular:      Rate and Rhythm: Regular rhythm. Bradycardia present.      Pulses: Normal pulses.   Pulmonary:      Effort: He is intubated.      Breath sounds: Normal air entry.      Comments: Inspiratory stridor, decreased in intensity as compared to 11/8  Abdominal:      General: Bowel sounds are normal.      Palpations: Abdomen is soft.   Skin:     General: Skin is warm.           Relevant Results    Lab Results   Component Value Date    WBC 16.6 (H) 11/08/2023    HGB 14.5 11/08/2023    HCT 40.2 (L) 11/08/2023    MCV 81 11/08/2023     11/08/2023     Lab Results   Component Value Date    CREATININE 0.95 11/09/2023    BUN 16 11/09/2023     11/09/2023    K 6.0 (H) 11/09/2023     11/09/2023    CO2 24 11/09/2023     Lab Results   Component Value Date    CALCIUM 8.2 (L) 11/09/2023    PHOS 5.4 (H) 11/09/2023     Lab Results   Component Value Date    ALT 20  11/09/2023    AST 42 (H) 11/09/2023    ALKPHOS 37 11/09/2023    BILITOT 0.6 11/09/2023     Lab Results   Component Value Date    INR 1.0 11/08/2023    PROTIME 11.2 11/08/2023     Cultures:   No results found for the last 90 days.    Cxr 11/9/2023  Ett 6.7 cm from bela  IMPRESSION:  1. Similar appearance of right-sided apical pneumothorax and  pneumomediastinum.  2. Similar component of interstitial edema.  3. Similar component of bilateral perihilar/upper lobe predominant  airspace opacities. Findings are may represent interstitial edema  with superimposed alveolar edema, however unable to exclude a  multifocal infectious process in the correct clinical context.  4. Interval improvement of the subcutaneous emphysema seen at the  neck.    XR chest 1 view Result Date: 11/8/2023     1. Unchanged small right apical pneumothorax. 2. Redemonstration of pneumomediastinum. 3. Mild ground-glass opacity and interstitial prominence which may be due to component of edema. 4. Unchanged subcutaneous and soft tissue emphysema.        XR chest 1 view Result Date: 11/8/2023      1. Interval development of subcutaneous and soft tissue emphysema. 2. New small right apical pneumothorax. 3. There is small amount of pneumomediastinum, also new compared to prior study. 4. Areas of airspace opacity in the right perihilar region which might be due to component of edema.       XR chest 1 view Result Date: 11/8/2023     1. Bilateral airspace opacities, suggestive of multifocal pneumonia. 2. Emphysema at the soft tissues of the visualized neck with linear lucencies extending along the mediastinum, suggestive of pneumomediastinum.                     This patient is intubated   Reason for patient to remain intubated today? they are planned for extubation tomorrow and they have upper airway obstruction or edema           Assessment/Plan   Principal Problem:    Angioedema, initial encounter  Active Problems:    Acute respiratory failure with  hypoxia and hypercapnia (CMS/HCC)    42 y.o. M who has been on lisinopril for 1 year presented for angioedema s/p icatibant x1, then cricothyrotomy, which was closed by ENT. Now orally intubated as edema is improving.      Neurologic  #sedation  - sedated on propofol and fentanyl. Versed on standby for breakthrough.   - will maintain deep sedation for 48 hours since ETT will be in for 48 hours     Cardiovascular  #HTN  - will hold home antihypertensives  - started lisinopril a year ago. Allergy is now listed in chart.      #h/of cardiac arrest, unclear history     #Afib, resolved  - presumed onset to due catecholamine surge  - now HR 50-60s, sinus         HEENT/Pulmonary  #ACE-I induced Angioedema suspected  -unclear when patient was placed on Lisinopril whether recently or in past   -s/p cricothyrotomy performed by the ED physician  -s/p icatibant 1 dose /Solu-Medrol/ FFP x2  -s/p OR with ENT. Cricothyroidotomy site close, intubated for 48 hours  Plan:  - intubation with deep sedation for next 48 hours  - unasyn 3g q6 for 48 hours 11/8 - 9  - dex 6 q6 for airway swelling  - c3/4 and c1 esterase pending  - ent following     #pneumothorax <2cm RUL, improving   #pneumomediastinum   - Likely related to abovementioned events.   - FiO2 to 40% now that it is improved  - AM cxr 11/9  - will ctm for need of chest tube if ptx grows or vitals decline     #Airspace Opacities  -suspect in setting of aspiration of blood vs less likely pneumonia  -on unasyn as above     #Acute Hypoxic & Hypercapnic Respiratory Failure, improving s/p Intubation  #Respiratory Acidosis, improving  -RT Eval & Tx Protocol     Gastrointestinal  Okay for NGT placement, and feeding         Renal   #hyperkalemia to 5.7, 5.6  - etiology: received propofol, succinylcholine, no renal impairment  - good uop  - 11/8 EKG w/o peaked t waves  - lipase wnl , ck wnl   - EKG sinus demetrio, no peaked t waves. Qtc 470, EKG from 5/2022: sinus demetrio, qtc 416  Plan:  -  insulin 10 mg iv and d5w       Heme/Onc  N/a     Endo  Low dose ssi with lispro. On dexamethasone for airway swelling     MSK/Rheum  #h/of back pain, strain  - in the past, treated with flexeril, prednisone, tylenol      Infectious Disease  Unasyn as above  Legionella and strep urine antigen pending     Last type and screen: 11/8     F:  prn  E: hyperkalemia protocols  N: NPO, ngt placed, will pursue enteral feeding   A: pivs   Drains: catheter 11/8     Bowel regimen: will place when enteral access is obtained  DVT ppx: subcutaneous heparin  GI ppx: iv protonix     Code: Full    Nok is sister Mary Kramer [phone]   Can also contact uncle lu [phone]              Ari Harrison MD

## 2023-11-09 NOTE — CARE PLAN
The clinical goals for the shift include patient will maintain patent airway    Problem: Mechanical Ventilation  Goal: Ability to express needs and understand communication  Outcome: Not Progressing  Goal: Mobility/activity is maintained at optimum level for patient  Outcome: Not Progressing     Problem: Knowledge Deficit  Goal: Patient/family/caregiver demonstrates understanding of disease process, treatment plan, medications, and discharge instructions  Outcome: Progressing     Problem: Mechanical Ventilation  Goal: Patient Will Maintain Patent Airway  Outcome: Progressing  Goal: ET tube will be managed safely  Outcome: Progressing     Problem: Mechanical Ventilation  Goal: Oral health is maintained or improved  Outcome: Met     Problem: Skin  Goal: Prevent/manage excess moisture  Outcome: Met  Goal: Prevent/minimize sheer/friction injuries  Outcome: Met   The patient's goals for the shift include      Problem: Mechanical Ventilation  Goal: Ability to express needs and understand communication  Outcome: Not Progressing  Goal: Mobility/activity is maintained at optimum level for patient  Outcome: Not Progressing

## 2023-11-09 NOTE — PROGRESS NOTES
SOCIAL WORK NOTE   SW met with patient (intubated) and family (uncle Bo [phone]) at bedside for assessment (please see flowsheets for further details). Patient normally lives at home with minor son (safe per uncle) and roommate. He is independent at baseline. Currently denied needs for social work. Social work to follow.   Belen Mai, JANNETH, LISW-S ([id number])

## 2023-11-09 NOTE — PROGRESS NOTES
Pharmacy Medication History Review    Javed Gil is a 42 y.o. male admitted for Angioedema, initial encounter. Pharmacy reviewed the patient's [id number] medications and allergies for accuracy.    The list below reflects the updated PTA list. Comments regarding how patient may be taking medications differently can be found in the Admit Orders Activity  Prior to Admission Medications   Prescriptions Last Dose Informant Patient Reported? Taking?   albuterol 90 mcg/actuation inhaler   Yes No   Sig: INHALE 2- 4 PUFFS INTO THE LUNGS EVERY 4 HOURS AS NEEDED for 16   diphenhydrAMINE (BENADryl) 25 mg capsule   No No   Sig: Take 2 capsules (50 mg) by mouth as needed at bedtime for allergies for up to 4 days.   famotidine (Pepcid) 20 mg tablet   No No   Sig: Take 2 tablets (40 mg) by mouth 2 times a day for 15 days.   predniSONE (Deltasone) 20 mg tablet   No No   Sig: Take 3 tablets (60 mg) by mouth once daily for 5 days.      Facility-Administered Medications: None        The list below reflects the updated allergy list. Please review each documented allergy for additional clarification and justification.  Allergies  Reviewed by Leslie Farah RN on 11/8/2023        Severity Reactions Comments    Lisinopril High Angioedema             Patient was unable to be assessed for M2B at discharge. Pharmacy has been updated to [facility] in [place].    Sources used to complete the med history include out patient fill history, OARRS, call to [facility] for recent fill history, and interview with patient's family  Patient intubated at time of interview- family unsure of home medications  Prior to admission list updated with most recent fills from [facility]    Below are additional concerns with the patient's PTA list.  Family reported blood pressure medication- per [facility] only had lisinopril in fill history     Rico Encinas, Ray  Transitions of Care Pharmacist  [facility]  Please  reach out via Secure Chat for questions, or if no response call [place] or [facility]

## 2023-11-09 NOTE — CONSULTS
"Nutrition Assessment    Nutrition Initial Assessment:   Reason for Assessment: Tube feeding recommendations    Patient is a 42 y.o. male presenting with angioedema from lisinopril.  Required cric at OSH.  As cric was dislodged, he was orally intubated after transfer to [facility] and cric was closed.  Remains intubated this morning, sedated with fentanyl and propofol.  No enteral access at this time.     Pt is NPO as he had no enteral access.  Current Propofol rate is 30.9mls/hr which provides 816kcals daily from fat.     Pt unable to provide nutrition or weight history at visit.     Anthropometrics:  Height: 182.9 cm (6' 0.01\")  Weight: 110 kg (242 lb 8.1 oz)  BMI (Calculated): 32.88  IBW/kg (Dietitian Calculated): 81 kg  Percent of IBW: 136 %       Nutrition Focused Physical Exam Findings:    Subcutaneous Fat Loss:   Orbital Fat Pads: Well nourshed (slightly bulging fat pads)   Buccal Fat Pads: Well nourished (full, rounded cheeks)   Triceps: Well nourished (ample fat tissue)       Muscle Wasting:  Temporalis: Well nourished (well-defined muscle)  Pectoralis (Clavicular Region): Well nourished (clavicle not visible)  Deltoid/Trapezius: Well nourished (rounded appearance at arm, shoulder, neck)        Quadriceps: Well nourished (well developed, well rounded)  Gastrocnemius: Well nourished (well developed bulbous muscle)  Edema:  Edema: none       Physical Findings:              Skin: Negative    Objective Data:       Nutrition Significant Labs:  Na+ 138  K+ 6.0  Chloride 104  Bicarb 24  BUN 16  Creatinine 0.95  Calcium 8.2  Phos 5.4  Mag 2.49  A1c 5.4%  Sugars 137, 123, 126    Nutrition Specific Mediations:    Current Facility-Administered Medications:     ampicillin-sulbactam (Unasyn) in sodium chloride 0.9 % 100 mL 3 g, 3 g, intravenous, q6h, Angela Balderas MD, Stopped at 11/09/23 0631    dexAMETHasone (Decadron) injection 6 mg, 6 mg, intravenous, q6h, Ari Harrison MD, 6 mg at 11/09/23 0716    dextrose 10 % " in water (D10W) infusion, 0.3 g/kg/hr, intravenous, Once PRN, Ari Harrison MD    dextrose 50 % injection 25 g, 25 g, intravenous, q15 min PRN, Ari Harrison MD    enoxaparin (Lovenox) syringe 40 mg, 40 mg, subcutaneous, Daily, Katey De Luna MD, 40 mg at 11/09/23 0846    fentanyl (Sublimaze) 1000 mcg in sodium chloride 0.9% 100 mL (10 mcg/mL) infusion (premix), 0-300 mcg/hr, intravenous, Continuous, Ari Harrison MD, Last Rate: 15 mL/hr at 11/09/23 0942, 150 mcg/hr at 11/09/23 0942    glucagon (Glucagen) injection 1 mg, 1 mg, intramuscular, q15 min PRN, Ari Harrison MD    insulin lispro (HumaLOG) injection 0-5 Units, 0-5 Units, subcutaneous, q4h, Ari Harrison MD    midazolam (Versed) injection 2 mg, 2 mg, intravenous, q2h PRN, Ari Harrison MD, 2 mg at 11/09/23 0716    oxygen (O2) therapy, , inhalation, Continuous PRN - O2/gases, Ari Harrison MD, Rate Verify at 11/09/23 1011    pantoprazole (ProtoNix) injection 40 mg, 40 mg, intravenous, Daily, Katey De Luna MD, 40 mg at 11/09/23 0847    polyethylene glycol (Glycolax, Miralax) packet 17 g, 17 g, oral, Daily, Ari Harrison MD    propofol (Diprivan) infusion, 5-50 mcg/kg/min, intravenous, Continuous, Katey De Luna MD, Last Rate: 30.9 mL/hr at 11/09/23 1029, 50 mcg/kg/min at 11/09/23 1029        Dietary Orders (From admission, onward)       Start     Ordered    11/08/23 1335  NPO Diet; Effective now  Diet effective now         11/08/23 1335                     Estimated Needs:   Total Energy Estimated Needs (kCal):  (6599-5441)   Method for Estimating Needs: PSU/RMR= 2052; MV= 6.0    Total Protein Estimated Needs (g):  (105-120)   Method for Estimating Needs: 81kg x 1.3-1.5          Nutrition Diagnosis   Nutrition Diagnosis:       Nutrition Diagnosis  Patient has Nutrition Diagnosis: Yes  Diagnosis Status (1): New  Nutrition Diagnosis 1: Inadequate protein energy intake  Related to (1): angioedema  As Evidenced by (1): need for  intubation     Pt may benefit from a lower calorie, higher protein formula while obese and critically ill.  Will provide recs for low electrolyte formula while lytes are elevated.    Goal rate TF with Propofol= 1680kcals, 39grams protein  Goal rate TF with prostat= 1712kcals, 102grams protein    Nutrition Interventions/Recommendations   Nutrition Interventions and Recommendations:        Nutrition Prescription:   For tube feed once access obtained: Nepro at start rate of 20mls/hr.  While on current rate of propofol, do not increase TF rate.  Once off of Propofol, increase TF to 35mls/hr reached.  One packet of Pro-Stat AWC BID.  Water flushes per team-- goal rate provides 590mls free water daily.         Time Spent/Follow-up Reminder:   Follow Up  Time Spent (min): 60 minutes  Last Date of Nutrition Visit: 11/09/23  Nutrition Follow-Up Needed?: Dietitian to reassess per policy  Follow up Comment: vent/no access/TF recs left

## 2023-11-09 NOTE — CARE PLAN
The clinical goals for the shift include patient will maintain patent airway    Problem: Knowledge Deficit  Goal: Patient/family/caregiver demonstrates understanding of disease process, treatment plan, medications, and discharge instructions  Outcome: Progressing     Problem: Mechanical Ventilation  Goal: Patient Will Maintain Patent Airway  Outcome: Progressing  Goal: ET tube will be managed safely  Outcome: Progressing     Problem: Mechanical Ventilation  Goal: Oral health is maintained or improved  Outcome: Met     Problem: Skin  Goal: Prevent/manage excess moisture  Outcome: Met  Goal: Prevent/minimize sheer/friction injuries  Outcome: Met     Problem: Mechanical Ventilation  Goal: Ability to express needs and understand communication  Outcome: Not Progressing  Goal: Mobility/activity is maintained at optimum level for patient  Outcome: Not Progressing

## 2023-11-09 NOTE — SIGNIFICANT EVENT
1115: Patient attempting to sit up in bed, remove equipment and communicate with rn.  Patient also coughing with dark red thick secretions noted in ETT.  MICU team and RT called to bedside. Patient lavaged and Suctioned multiple times. Precedex drip initiated at 1mcg/kg/hr.  Patient bolused with 25mg propofol and 50 mcg fentanyl per dr pandya.    1125: patient remains awake on propofol fentanyl and precedex.  propofol drip increased to 60mcg/kg/min per dr pandya.  1129: fentanyl drip increased to 175mcg/hr per dr pandya.

## 2023-11-10 ENCOUNTER — APPOINTMENT (OUTPATIENT)
Dept: RADIOLOGY | Facility: HOSPITAL | Age: 43
DRG: 915 | End: 2023-11-10
Payer: COMMERCIAL

## 2023-11-10 LAB
ALBUMIN SERPL BCP-MCNC: 3.8 G/DL (ref 3.4–5)
AMPHETAMINES UR QL SCN: ABNORMAL
ANION GAP SERPL CALC-SCNC: 16 MMOL/L (ref 10–20)
BARBITURATES UR QL SCN: ABNORMAL
BASOPHILS # BLD AUTO: 0.01 X10*3/UL (ref 0–0.1)
BASOPHILS NFR BLD AUTO: 0.1 %
BASOPHILS NFR FLD MANUAL: 0 %
BENZODIAZ UR QL SCN: ABNORMAL
BLASTS NFR FLD MANUAL: 0 %
BUN SERPL-MCNC: 19 MG/DL (ref 6–23)
BZE UR QL SCN: ABNORMAL
C1INH SERPL-MCNC: 31 MG/DL (ref 21–38)
CALCIUM SERPL-MCNC: 8.6 MG/DL (ref 8.6–10.6)
CANNABINOIDS UR QL SCN: ABNORMAL
CH50 SERPL-ACNC: 54.3 U/ML (ref 38.7–89.9)
CHLORIDE SERPL-SCNC: 105 MMOL/L (ref 98–107)
CLARITY FLD: ABNORMAL
CO2 SERPL-SCNC: 26 MMOL/L (ref 21–32)
COLOR FLD: ABNORMAL
CREAT SERPL-MCNC: 0.9 MG/DL (ref 0.5–1.3)
EOSINOPHIL # BLD AUTO: 0 X10*3/UL (ref 0–0.7)
EOSINOPHIL NFR BLD AUTO: 0 %
EOSINOPHIL NFR FLD MANUAL: 0 %
ERYTHROCYTE [DISTWIDTH] IN BLOOD BY AUTOMATED COUNT: 13.7 % (ref 11.5–14.5)
FENTANYL+NORFENTANYL UR QL SCN: ABNORMAL
GFR SERPL CREATININE-BSD FRML MDRD: >90 ML/MIN/1.73M*2
GLUCOSE BLD MANUAL STRIP-MCNC: 117 MG/DL (ref 74–99)
GLUCOSE BLD MANUAL STRIP-MCNC: 122 MG/DL (ref 74–99)
GLUCOSE BLD MANUAL STRIP-MCNC: 124 MG/DL (ref 74–99)
GLUCOSE BLD MANUAL STRIP-MCNC: 132 MG/DL (ref 74–99)
GLUCOSE BLD MANUAL STRIP-MCNC: 135 MG/DL (ref 74–99)
GLUCOSE BLD MANUAL STRIP-MCNC: 136 MG/DL (ref 74–99)
GLUCOSE SERPL-MCNC: 131 MG/DL (ref 74–99)
HCT VFR BLD AUTO: 42.3 % (ref 41–52)
HGB BLD-MCNC: 14.9 G/DL (ref 13.5–17.5)
IMM GRANULOCYTES # BLD AUTO: 0.1 X10*3/UL (ref 0–0.7)
IMM GRANULOCYTES NFR BLD AUTO: 0.6 % (ref 0–0.9)
IMMATURE GRANULOCYTES IN FLUID: 0 %
LYMPHOCYTES # BLD AUTO: 0.85 X10*3/UL (ref 1.2–4.8)
LYMPHOCYTES NFR BLD AUTO: 5.5 %
LYMPHOCYTES NFR FLD MANUAL: 2 %
MCH RBC QN AUTO: 29.6 PG (ref 26–34)
MCHC RBC AUTO-ENTMCNC: 35.2 G/DL (ref 32–36)
MCV RBC AUTO: 84 FL (ref 80–100)
MONOCYTES # BLD AUTO: 0.33 X10*3/UL (ref 0.1–1)
MONOCYTES NFR BLD AUTO: 2.1 %
MONOS+MACROS NFR FLD MANUAL: 83 %
NEUTROPHILS # BLD AUTO: 14.16 X10*3/UL (ref 1.2–7.7)
NEUTROPHILS NFR BLD AUTO: 91.7 %
NEUTROPHILS NFR FLD MANUAL: 15 %
NRBC BLD-RTO: 0 /100 WBCS (ref 0–0)
OPIATES UR QL SCN: ABNORMAL
OTHER CELLS NFR FLD MANUAL: 0 %
OXYCODONE+OXYMORPHONE UR QL SCN: ABNORMAL
PCP UR QL SCN: ABNORMAL
PHOSPHATE SERPL-MCNC: 4 MG/DL (ref 2.5–4.9)
PLASMA CELLS NFR FLD MANUAL: 0 %
PLATELET # BLD AUTO: 233 X10*3/UL (ref 150–450)
POTASSIUM SERPL-SCNC: 4 MMOL/L (ref 3.5–5.3)
RBC # BLD AUTO: 5.04 X10*6/UL (ref 4.5–5.9)
RBC # FLD AUTO: 7000 /UL
SODIUM SERPL-SCNC: 143 MMOL/L (ref 136–145)
TOTAL CELLS COUNTED FLD: 100
WBC # BLD AUTO: 15.5 X10*3/UL (ref 4.4–11.3)
WBC # FLD AUTO: 108 /UL

## 2023-11-10 PROCEDURE — 85025 COMPLETE CBC W/AUTO DIFF WBC: CPT

## 2023-11-10 PROCEDURE — 2500000001 HC RX 250 WO HCPCS SELF ADMINISTERED DRUGS (ALT 637 FOR MEDICARE OP)

## 2023-11-10 PROCEDURE — 87102 FUNGUS ISOLATION CULTURE: CPT | Performed by: STUDENT IN AN ORGANIZED HEALTH CARE EDUCATION/TRAINING PROGRAM

## 2023-11-10 PROCEDURE — 88312 SPECIAL STAINS GROUP 1: CPT | Mod: TC,MCY | Performed by: STUDENT IN AN ORGANIZED HEALTH CARE EDUCATION/TRAINING PROGRAM

## 2023-11-10 PROCEDURE — 89051 BODY FLUID CELL COUNT: CPT | Performed by: STUDENT IN AN ORGANIZED HEALTH CARE EDUCATION/TRAINING PROGRAM

## 2023-11-10 PROCEDURE — C9113 INJ PANTOPRAZOLE SODIUM, VIA: HCPCS | Performed by: STUDENT IN AN ORGANIZED HEALTH CARE EDUCATION/TRAINING PROGRAM

## 2023-11-10 PROCEDURE — 71045 X-RAY EXAM CHEST 1 VIEW: CPT | Performed by: RADIOLOGY

## 2023-11-10 PROCEDURE — 87581 M.PNEUMON DNA AMP PROBE: CPT | Performed by: STUDENT IN AN ORGANIZED HEALTH CARE EDUCATION/TRAINING PROGRAM

## 2023-11-10 PROCEDURE — 2500000005 HC RX 250 GENERAL PHARMACY W/O HCPCS

## 2023-11-10 PROCEDURE — 87305 ASPERGILLUS AG IA: CPT | Performed by: STUDENT IN AN ORGANIZED HEALTH CARE EDUCATION/TRAINING PROGRAM

## 2023-11-10 PROCEDURE — 87799 DETECT AGENT NOS DNA QUANT: CPT | Performed by: STUDENT IN AN ORGANIZED HEALTH CARE EDUCATION/TRAINING PROGRAM

## 2023-11-10 PROCEDURE — 2500000004 HC RX 250 GENERAL PHARMACY W/ HCPCS (ALT 636 FOR OP/ED)

## 2023-11-10 PROCEDURE — 88104 CYTOPATH FL NONGYN SMEARS: CPT | Performed by: STUDENT IN AN ORGANIZED HEALTH CARE EDUCATION/TRAINING PROGRAM

## 2023-11-10 PROCEDURE — 87070 CULTURE OTHR SPECIMN AEROBIC: CPT | Performed by: STUDENT IN AN ORGANIZED HEALTH CARE EDUCATION/TRAINING PROGRAM

## 2023-11-10 PROCEDURE — 82947 ASSAY GLUCOSE BLOOD QUANT: CPT

## 2023-11-10 PROCEDURE — 87632 RESP VIRUS 6-11 TARGETS: CPT | Performed by: STUDENT IN AN ORGANIZED HEALTH CARE EDUCATION/TRAINING PROGRAM

## 2023-11-10 PROCEDURE — 0B9D8ZX DRAINAGE OF RIGHT MIDDLE LUNG LOBE, VIA NATURAL OR ARTIFICIAL OPENING ENDOSCOPIC, DIAGNOSTIC: ICD-10-PCS | Performed by: STUDENT IN AN ORGANIZED HEALTH CARE EDUCATION/TRAINING PROGRAM

## 2023-11-10 PROCEDURE — 87116 MYCOBACTERIA CULTURE: CPT | Performed by: STUDENT IN AN ORGANIZED HEALTH CARE EDUCATION/TRAINING PROGRAM

## 2023-11-10 PROCEDURE — 94003 VENT MGMT INPAT SUBQ DAY: CPT

## 2023-11-10 PROCEDURE — 96372 THER/PROPH/DIAG INJ SC/IM: CPT | Performed by: STUDENT IN AN ORGANIZED HEALTH CARE EDUCATION/TRAINING PROGRAM

## 2023-11-10 PROCEDURE — 51701 INSERT BLADDER CATHETER: CPT

## 2023-11-10 PROCEDURE — 2500000004 HC RX 250 GENERAL PHARMACY W/ HCPCS (ALT 636 FOR OP/ED): Performed by: STUDENT IN AN ORGANIZED HEALTH CARE EDUCATION/TRAINING PROGRAM

## 2023-11-10 PROCEDURE — 87533 HHV-6 DNA QUANT: CPT | Performed by: STUDENT IN AN ORGANIZED HEALTH CARE EDUCATION/TRAINING PROGRAM

## 2023-11-10 PROCEDURE — 80069 RENAL FUNCTION PANEL: CPT

## 2023-11-10 PROCEDURE — 88112 CYTOPATH CELL ENHANCE TECH: CPT | Performed by: PATHOLOGY

## 2023-11-10 PROCEDURE — 87801 DETECT AGNT MULT DNA AMPLI: CPT | Performed by: STUDENT IN AN ORGANIZED HEALTH CARE EDUCATION/TRAINING PROGRAM

## 2023-11-10 PROCEDURE — 1100000001 HC PRIVATE ROOM DAILY

## 2023-11-10 PROCEDURE — 36415 COLL VENOUS BLD VENIPUNCTURE: CPT

## 2023-11-10 PROCEDURE — 31624 DX BRONCHOSCOPE/LAVAGE: CPT | Performed by: STUDENT IN AN ORGANIZED HEALTH CARE EDUCATION/TRAINING PROGRAM

## 2023-11-10 PROCEDURE — 80307 DRUG TEST PRSMV CHEM ANLYZR: CPT

## 2023-11-10 PROCEDURE — 87075 CULTR BACTERIA EXCEPT BLOOD: CPT | Performed by: STUDENT IN AN ORGANIZED HEALTH CARE EDUCATION/TRAINING PROGRAM

## 2023-11-10 PROCEDURE — 88312 SPECIAL STAINS GROUP 1: CPT | Performed by: PATHOLOGY

## 2023-11-10 PROCEDURE — 99291 CRITICAL CARE FIRST HOUR: CPT

## 2023-11-10 PROCEDURE — 71045 X-RAY EXAM CHEST 1 VIEW: CPT | Mod: FY

## 2023-11-10 RX ORDER — MIDAZOLAM HYDROCHLORIDE 1 MG/ML
3 INJECTION INTRAMUSCULAR; INTRAVENOUS ONCE
Status: COMPLETED | OUTPATIENT
Start: 2023-11-10 | End: 2023-11-10

## 2023-11-10 RX ORDER — LIDOCAINE HYDROCHLORIDE 10 MG/ML
INJECTION INFILTRATION; PERINEURAL
Status: COMPLETED
Start: 2023-11-10 | End: 2023-11-10

## 2023-11-10 RX ORDER — MIDAZOLAM HYDROCHLORIDE 1 MG/ML
2 INJECTION INTRAMUSCULAR; INTRAVENOUS EVERY 2 HOUR PRN
Status: DISCONTINUED | OUTPATIENT
Start: 2023-11-10 | End: 2023-11-11

## 2023-11-10 RX ORDER — ALBUTEROL SULFATE 90 UG/1
2 AEROSOL, METERED RESPIRATORY (INHALATION) EVERY 4 HOURS PRN
Status: DISCONTINUED | OUTPATIENT
Start: 2023-11-10 | End: 2023-11-12 | Stop reason: HOSPADM

## 2023-11-10 RX ORDER — MIDAZOLAM HYDROCHLORIDE 1 MG/ML
INJECTION INTRAMUSCULAR; INTRAVENOUS
Status: DISPENSED
Start: 2023-11-10 | End: 2023-11-11

## 2023-11-10 RX ORDER — FENTANYL CITRATE 50 UG/ML
INJECTION, SOLUTION INTRAMUSCULAR; INTRAVENOUS CODE/TRAUMA/SEDATION MEDICATION
Status: COMPLETED | OUTPATIENT
Start: 2023-11-10 | End: 2023-11-10

## 2023-11-10 RX ORDER — HYDRALAZINE HYDROCHLORIDE 20 MG/ML
10 INJECTION INTRAMUSCULAR; INTRAVENOUS EVERY 8 HOURS PRN
Status: DISCONTINUED | OUTPATIENT
Start: 2023-11-10 | End: 2023-11-11

## 2023-11-10 RX ORDER — HYDRALAZINE HYDROCHLORIDE 20 MG/ML
INJECTION INTRAMUSCULAR; INTRAVENOUS
Status: COMPLETED
Start: 2023-11-10 | End: 2023-11-10

## 2023-11-10 RX ORDER — MIDAZOLAM HYDROCHLORIDE 1 MG/ML
INJECTION INTRAMUSCULAR; INTRAVENOUS CODE/TRAUMA/SEDATION MEDICATION
Status: COMPLETED | OUTPATIENT
Start: 2023-11-10 | End: 2023-11-10

## 2023-11-10 RX ADMIN — HYDRALAZINE HYDROCHLORIDE 10 MG: 20 INJECTION INTRAMUSCULAR; INTRAVENOUS at 00:00

## 2023-11-10 RX ADMIN — PROPOFOL 50 MCG/KG/MIN: 10 INJECTION, EMULSION INTRAVENOUS at 11:41

## 2023-11-10 RX ADMIN — PROPOFOL 50 MCG/KG/MIN: 10 INJECTION, EMULSION INTRAVENOUS at 02:22

## 2023-11-10 RX ADMIN — DEXMEDETOMIDINE HYDROCHLORIDE 1 MCG/KG/HR: 400 INJECTION INTRAVENOUS at 11:41

## 2023-11-10 RX ADMIN — DEXMEDETOMIDINE HYDROCHLORIDE 0.8 MCG/KG/HR: 400 INJECTION INTRAVENOUS at 04:26

## 2023-11-10 RX ADMIN — AMPICILLIN SODIUM AND SULBACTAM SODIUM 3 G: 2; 1 INJECTION, POWDER, FOR SOLUTION INTRAMUSCULAR; INTRAVENOUS at 00:00

## 2023-11-10 RX ADMIN — Medication 4.5 L/MIN: at 17:00

## 2023-11-10 RX ADMIN — HYDRALAZINE HYDROCHLORIDE 10 MG: 20 INJECTION INTRAMUSCULAR; INTRAVENOUS at 19:53

## 2023-11-10 RX ADMIN — Medication: at 19:45

## 2023-11-10 RX ADMIN — Medication 175 MCG/HR: at 11:41

## 2023-11-10 RX ADMIN — MIDAZOLAM HYDROCHLORIDE 2 MG: 1 INJECTION, SOLUTION INTRAMUSCULAR; INTRAVENOUS at 16:17

## 2023-11-10 RX ADMIN — PROPOFOL 50 MCG/KG/MIN: 10 INJECTION, EMULSION INTRAVENOUS at 05:44

## 2023-11-10 RX ADMIN — DEXAMETHASONE SODIUM PHOSPHATE 6 MG: 10 INJECTION INTRAMUSCULAR; INTRAVENOUS at 13:02

## 2023-11-10 RX ADMIN — DEXAMETHASONE SODIUM PHOSPHATE 6 MG: 10 INJECTION INTRAMUSCULAR; INTRAVENOUS at 19:51

## 2023-11-10 RX ADMIN — LIDOCAINE HYDROCHLORIDE 500 MG: 10 INJECTION, SOLUTION INFILTRATION; PERINEURAL at 18:24

## 2023-11-10 RX ADMIN — AMPICILLIN SODIUM AND SULBACTAM SODIUM 3 G: 2; 1 INJECTION, POWDER, FOR SOLUTION INTRAMUSCULAR; INTRAVENOUS at 12:58

## 2023-11-10 RX ADMIN — PROPOFOL 50 MCG/KG/MIN: 10 INJECTION, EMULSION INTRAVENOUS at 07:43

## 2023-11-10 RX ADMIN — DEXAMETHASONE SODIUM PHOSPHATE 6 MG: 10 INJECTION INTRAMUSCULAR; INTRAVENOUS at 02:03

## 2023-11-10 RX ADMIN — DEXAMETHASONE SODIUM PHOSPHATE 6 MG: 10 INJECTION INTRAMUSCULAR; INTRAVENOUS at 07:44

## 2023-11-10 RX ADMIN — ENOXAPARIN SODIUM 40 MG: 100 INJECTION SUBCUTANEOUS at 08:34

## 2023-11-10 RX ADMIN — Medication 175 MCG/HR: at 07:43

## 2023-11-10 RX ADMIN — DEXMEDETOMIDINE HYDROCHLORIDE 0.8 MCG/KG/HR: 400 INJECTION INTRAVENOUS at 08:49

## 2023-11-10 RX ADMIN — Medication 175 MCG/HR: at 02:22

## 2023-11-10 RX ADMIN — PANTOPRAZOLE SODIUM 40 MG: 40 INJECTION, POWDER, FOR SOLUTION INTRAVENOUS at 08:34

## 2023-11-10 RX ADMIN — MIDAZOLAM HYDROCHLORIDE 1 MG: 1 INJECTION, SOLUTION INTRAMUSCULAR; INTRAVENOUS at 16:41

## 2023-11-10 RX ADMIN — FENTANYL CITRATE 25 MCG: 50 INJECTION, SOLUTION INTRAMUSCULAR; INTRAVENOUS at 16:39

## 2023-11-10 RX ADMIN — AMPICILLIN SODIUM AND SULBACTAM SODIUM 3 G: 2; 1 INJECTION, POWDER, FOR SOLUTION INTRAMUSCULAR; INTRAVENOUS at 06:34

## 2023-11-10 RX ADMIN — MIDAZOLAM HYDROCHLORIDE 3 MG: 1 INJECTION, SOLUTION INTRAMUSCULAR; INTRAVENOUS at 16:16

## 2023-11-10 RX ADMIN — MIDAZOLAM HYDROCHLORIDE 2 MG: 1 INJECTION, SOLUTION INTRAMUSCULAR; INTRAVENOUS at 14:02

## 2023-11-10 RX ADMIN — PROPOFOL 50 MCG/KG/MIN: 10 INJECTION, EMULSION INTRAVENOUS at 15:30

## 2023-11-10 ASSESSMENT — PAIN - FUNCTIONAL ASSESSMENT
PAIN_FUNCTIONAL_ASSESSMENT: CPOT (CRITICAL CARE PAIN OBSERVATION TOOL)

## 2023-11-10 NOTE — PROGRESS NOTES
SOCIAL WORK NOTE   SW was asked to assist with NOK. Per family at bedside, listed sister is not actually related. Number for daughter (Beronica Agee [phone]) given. VM left. Social work to follow  UPDATE: Team asking for consent if daughter cannot be reached and other listed relatives are reortely not appropriate. Per Ethics, next reachable NOK is sufficent for consent in urgent situations. (uncle Bo [phone])   JANNETH Jarvis, LISW-S ([id number])

## 2023-11-10 NOTE — CARE PLAN
The patient's goals for the shift include      The clinical goals for the shift include patient will maintain patent airway    Over the shift, the patient did not make progress toward the following goals. Barriers to progression include . Recommendations to address these barriers include .

## 2023-11-10 NOTE — SIGNIFICANT EVENT
POCUS of the lung at bedside this morning showed good sliding of the pleura bilaterally at 4 points with [place] sing on M mode. Those findings argue against worsening pneumothorax.       Theodora Camejo MD

## 2023-11-10 NOTE — PROGRESS NOTES
"Javed Gli is a 42 y.o. male on day 2 of admission presenting with Angioedema, initial encounter.    Javed Gil is a 42 y.o.  male with HTN, started on lisinopril one year ago, presenting with  presumed ACE-I induced Angioedema.        Subjective   No overnight event          Objective         Last Recorded Vitals  Blood pressure (!) 182/106, pulse (!) 43, temperature 37.1 °C (98.8 °F), temperature source Temporal, resp. rate 15, height 1.829 m (6' 0.01\"), weight 107 kg (235 lb 10.8 oz), SpO2 98 %.  Intake/Output last 3 Shifts:  I/O last 3 completed shifts:  In: 3553.9 (33.2 mL/kg) [I.V.:2953.9 (27.6 mL/kg); IV Piggyback:600]  Out: 4975 (46.5 mL/kg) [Urine:4975 (1.3 mL/kg/hr)]  Weight: 106.9 kg   3 L UOP   Vent Mode: Volume control/assist control  FiO2 (%):  [40 %] 40 %  S RR:  [15-18] 15  S VT:  [450 mL] 450 mL  PEEP/CPAP (cm H2O):  [5 cm H20] 5 cm H20  MAP (cm H2O):  [9-14] 14      Physical Exam  Constitutional:       Interventions: He is sedated, intubated and restrained.   HENT:      Head:      Comments: Swelling in lower jaw  Lips with dried blood  intubated  Neck:      Comments: Cric incision cdi   Cardiovascular:      Rate and Rhythm: Regular rhythm. Bradycardia present.      Pulses: Normal pulses.   Pulmonary:      Effort: He is intubated.      Breath sounds: Normal air entry.      Comments: Inspiratory stridor, decreased in intensity as compared to 11/8  Abdominal:      General: Bowel sounds are normal.      Palpations: Abdomen is soft.   Skin:     General: Skin is warm.           Relevant Results    Lab Results   Component Value Date    WBC 15.5 (H) 11/10/2023    HGB 14.9 11/10/2023    HCT 42.3 11/10/2023    MCV 84 11/10/2023     11/10/2023     Lab Results   Component Value Date    CREATININE 0.90 11/10/2023    BUN 19 11/10/2023     11/10/2023    K 4.0 11/10/2023     11/10/2023    CO2 26 11/10/2023     Lab Results   Component Value Date    CALCIUM 8.6 " 11/10/2023    PHOS 4.0 11/10/2023     Lab Results   Component Value Date    ALT 20 11/09/2023    AST 42 (H) 11/09/2023    ALKPHOS 37 11/09/2023    BILITOT 0.6 11/09/2023     Lab Results   Component Value Date    INR 1.1 11/09/2023    INR 1.0 11/08/2023    PROTIME 12.2 11/09/2023    PROTIME 11.2 11/08/2023     Cultures:   No results found for the last 90 days.    Cxr 11/9/2023  Ett 6.7 cm from bela  IMPRESSION:  1. Similar appearance of right-sided apical pneumothorax and  pneumomediastinum.  2. Similar component of interstitial edema.  3. Similar component of bilateral perihilar/upper lobe predominant  airspace opacities. Findings are may represent interstitial edema  with superimposed alveolar edema, however unable to exclude a  multifocal infectious process in the correct clinical context.  4. Interval improvement of the subcutaneous emphysema seen at the  neck.    XR chest 1 view Result Date: 11/8/2023     1. Unchanged small right apical pneumothorax. 2. Redemonstration of pneumomediastinum. 3. Mild ground-glass opacity and interstitial prominence which may be due to component of edema. 4. Unchanged subcutaneous and soft tissue emphysema.        XR chest 1 view Result Date: 11/8/2023      1. Interval development of subcutaneous and soft tissue emphysema. 2. New small right apical pneumothorax. 3. There is small amount of pneumomediastinum, also new compared to prior study. 4. Areas of airspace opacity in the right perihilar region which might be due to component of edema.       XR chest 1 view Result Date: 11/8/2023     1. Bilateral airspace opacities, suggestive of multifocal pneumonia. 2. Emphysema at the soft tissues of the visualized neck with linear lucencies extending along the mediastinum, suggestive of pneumomediastinum.        Scheduled medications  ampicillin-sulbactam, 3 g, intravenous, q6h  dexAMETHasone, 6 mg, intravenous, q6h  [Held by provider] enoxaparin, 40 mg, subcutaneous, Daily  insulin  lispro, 0-5 Units, subcutaneous, q4h  pantoprazole, 40 mg, intravenous, Daily  polyethylene glycol, 17 g, oral, Daily      Continuous medications  dexmedeTOMIDine, 0.1-1.5 mcg/kg/hr, Last Rate: 0.6 mcg/kg/hr (11/10/23 1215)  fentaNYL, 0-300 mcg/hr, Last Rate: 200 mcg/hr (11/10/23 1215)  propofol, 5-50 mcg/kg/min, Last Rate: 50 mcg/kg/min (11/10/23 1200)      PRN medications  PRN medications: dextrose 10 % in water (D10W), dextrose, fentaNYL, glucagon, oxygen             Assessment/Plan   Principal Problem:    Angioedema, initial encounter  Active Problems:    Acute respiratory failure with hypoxia and hypercapnia (CMS/HCC)    42 y.o. M who has been on lisinopril for 1 year presented for angioedema s/p icatibant x1, then cricothyrotomy, which was closed by ENT. Now orally intubated as edema is improving.      Neurologic  #sedation  - sedated on propofol and fentanyl. Versed on standby for breakthrough.   - will maintain deep sedation for 48 hours since ETT will be in for 48 hours     Cardiovascular  #HTN  - will hold home antihypertensives  - started lisinopril a year ago. Allergy is now listed in chart.      #h/of cardiac arrest, unclear history     #Afib, resolved  - presumed onset to due catecholamine surge  - now HR 50-60s, sinus         HEENT/Pulmonary  #ACE-I induced Angioedema suspected  -unclear when patient was placed on Lisinopril whether recently or in past   -s/p cricothyrotomy performed by the ED physician  -s/p icatibant 1 dose /Solu-Medrol/ FFP x2  -s/p OR with ENT. Cricothyroidotomy site close, intubated for 48 hours  -pt developed hemoptysis will do bronchoscopy today and decide on extubation.   -POCUS no signs for pneumothorax        Plan:  - intubation with deep sedation for next 48 hours  -Bronchoscopy today   -sputum culture   -repeat chest xray   - unasyn 3g q6 f  - dex 6 q6 for airway swelling  - c3/4 and c1 esterase pending  - ent following     #pneumothorax <2cm RUL, improving    #pneumomediastinum   - Likely related to abovementioned events.   - FiO2 to 40% now that it is improved  - AM cxr 11/9  - will ctm for need of chest tube if ptx grows or vitals decline     #Airspace Opacities  -suspect in setting of aspiration of blood vs less likely pneumonia  -on unasyn as above     #Acute Hypoxic & Hypercapnic Respiratory Failure, improving s/p Intubation  #Respiratory Acidosis, improving  -RT Eval & Tx Protocol     Gastrointestinal  Okay for NGT placement, and feeding         Renal   #hyperkalemia to 5.7, 5.6  - etiology: received propofol, succinylcholine, no renal impairment  - good uop  - 11/8 EKG w/o peaked t waves  - lipase wnl , ck wnl   - EKG sinus demetrio, no peaked t waves. Qtc 470, EKG from 5/2022: sinus demetrio, qtc 416  Plan:  - insulin 10 mg iv and d5w       Heme/Onc  N/a     Endo  Low dose ssi with lispro. On dexamethasone for airway swelling     MSK/Rheum  #h/of back pain, strain  - in the past, treated with flexeril, prednisone, tylenol      Infectious Disease  Unasyn as above  Legionella and strep urine antigen pending     Last type and screen: 11/8     F:  prn  E: hyperkalemia protocols  N: NPO, ngt placed, will pursue enteral feeding   A: pivs   Drains: catheter 11/8     Bowel regimen: will place when enteral access is obtained  DVT ppx: subcutaneous heparin  GI ppx: iv protonix     Code: Full    Nok is sister Mary Kramer [phone]   Can also contact uncle lu [phone]      Angela Balderas MD

## 2023-11-10 NOTE — PROGRESS NOTES
ENT DEPARTMENT PROGRESS NOTE  Name: Javed Gil  MRN: [id number]  : 1980  Consulting Team: ANNIE Perez  Reason for Consult: unstable cric airway    Subjective: patient is potop day 1 from conversion of Cric to orotracheal intubation in the OR and DL with findings of resolving edema. Plan to extubate 11/10 if appropriate    Seen and examined and sedated.     Vital Signs  Vitals:    11/10/23 0600   BP: (!) 169/103   Pulse: (!) 48   Resp: 15   Temp:    SpO2: 98%       Physical Examination  GEN: The patient appears stated age in no acute distress  VOICE: Unable to assess voice due to intubation and sedation  RESP: orotracheally intubated  CV: Clinically well perfused   NEURO: Alert and oriented with no focal deficits and CN II-XII symmetric and intact bilaterally  HEAD: Scalp is normocephalic and atraumatic  FACE: No abrasions or lacerations, no maxillary or mandibular stepoffs  EARS: Normal external ears  NOSE: External nose appears normal, no significant septal deviation, anterior rhinoscopy limited with no active bleeding or lesions  OC: Notable facial, lip, and tongue swelling with evidence of dried blood periorally.  OP: Unable to visualize due to sedation bedside  NECK: Trachea midline, no significant lymphadenopathy. Cric incision closed with absorbable sutures. No hematoma.     Laboratory and Data  Results for orders placed or performed during the hospital encounter of 23 (from the past 24 hour(s))   POCT GLUCOSE   Result Value Ref Range    POCT Glucose 137 (H) 74 - 99 mg/dL   CBC and Auto Differential   Result Value Ref Range    WBC 16.2 (H) 4.4 - 11.3 x10*3/uL    nRBC 0.0 0.0 - 0.0 /100 WBCs    RBC 4.63 4.50 - 5.90 x10*6/uL    Hemoglobin 13.7 13.5 - 17.5 g/dL    Hematocrit 37.3 (L) 41.0 - 52.0 %    MCV 81 80 - 100 fL    MCH 29.6 26.0 - 34.0 pg    MCHC 36.7 (H) 32.0 - 36.0 g/dL    RDW 13.2 11.5 - 14.5 %    Platelets 234 150 - 450 x10*3/uL    Neutrophils % 88.6 40.0 - 80.0 %     Immature Granulocytes %, Automated 0.6 0.0 - 0.9 %    Lymphocytes % 5.8 13.0 - 44.0 %    Monocytes % 4.9 2.0 - 10.0 %    Eosinophils % 0.0 0.0 - 6.0 %    Basophils % 0.1 0.0 - 2.0 %    Neutrophils Absolute 14.32 (H) 1.20 - 7.70 x10*3/uL    Immature Granulocytes Absolute, Automated 0.10 0.00 - 0.70 x10*3/uL    Lymphocytes Absolute 0.94 (L) 1.20 - 4.80 x10*3/uL    Monocytes Absolute 0.79 0.10 - 1.00 x10*3/uL    Eosinophils Absolute 0.00 0.00 - 0.70 x10*3/uL    Basophils Absolute 0.01 0.00 - 0.10 x10*3/uL   Coagulation Screen   Result Value Ref Range    Protime 12.2 9.8 - 12.8 seconds    INR 1.1 0.9 - 1.1    aPTT 28 27 - 38 seconds   Urine electrolytes   Result Value Ref Range    Sodium, Urine Random 18 mmol/L    Sodium/Creatinine Ratio 11 Not established. mmol/g Creat    Potassium, Urine Random 73 mmol/L    Potassium/Creatinine Ratio 43 Not established mmol/g Creat    Chloride, Urine Random 29 mmol/L    Chloride/Creatinine Ratio 17 (L) 23 - 275 mmol/g creat    Creatinine, Urine Random 167.9 20.0 - 370.0 mg/dL   Renal function panel   Result Value Ref Range    Glucose 126 (H) 74 - 99 mg/dL    Sodium 139 136 - 145 mmol/L    Potassium 6.1 (HH) 3.5 - 5.3 mmol/L    Chloride 105 98 - 107 mmol/L    Bicarbonate 25 21 - 32 mmol/L    Anion Gap 15 10 - 20 mmol/L    Urea Nitrogen 18 6 - 23 mg/dL    Creatinine 0.80 0.50 - 1.30 mg/dL    eGFR >90 >60 mL/min/1.73m*2    Calcium 8.5 (L) 8.6 - 10.6 mg/dL    Phosphorus 4.3 2.5 - 4.9 mg/dL    Albumin 3.9 3.4 - 5.0 g/dL   POCT GLUCOSE   Result Value Ref Range    POCT Glucose 128 (H) 74 - 99 mg/dL   Creatine Kinase   Result Value Ref Range    Creatine Kinase 182 0 - 325 U/L   Creatine Kinase, MB   Result Value Ref Range    CKMB <1.0 <10.0 ng/mL    CK-MB Index     Potassium   Result Value Ref Range    Potassium 6.3 (HH) 3.5 - 5.3 mmol/L   POCT GLUCOSE   Result Value Ref Range    POCT Glucose 153 (H) 74 - 99 mg/dL   POCT GLUCOSE   Result Value Ref Range    POCT Glucose 113 (H) 74 - 99 mg/dL    Renal function panel   Result Value Ref Range    Glucose 117 (H) 74 - 99 mg/dL    Sodium 142 136 - 145 mmol/L    Potassium 4.6 3.5 - 5.3 mmol/L    Chloride 104 98 - 107 mmol/L    Bicarbonate 28 21 - 32 mmol/L    Anion Gap 15 10 - 20 mmol/L    Urea Nitrogen 17 6 - 23 mg/dL    Creatinine 0.90 0.50 - 1.30 mg/dL    eGFR >90 >60 mL/min/1.73m*2    Calcium 8.8 8.6 - 10.6 mg/dL    Phosphorus 3.8 2.5 - 4.9 mg/dL    Albumin 4.0 3.4 - 5.0 g/dL   POCT GLUCOSE   Result Value Ref Range    POCT Glucose 144 (H) 74 - 99 mg/dL   POCT GLUCOSE   Result Value Ref Range    POCT Glucose 135 (H) 74 - 99 mg/dL   Renal Function Panel   Result Value Ref Range    Glucose 131 (H) 74 - 99 mg/dL    Sodium 143 136 - 145 mmol/L    Potassium 4.0 3.5 - 5.3 mmol/L    Chloride 105 98 - 107 mmol/L    Bicarbonate 26 21 - 32 mmol/L    Anion Gap 16 10 - 20 mmol/L    Urea Nitrogen 19 6 - 23 mg/dL    Creatinine 0.90 0.50 - 1.30 mg/dL    eGFR >90 >60 mL/min/1.73m*2    Calcium 8.6 8.6 - 10.6 mg/dL    Phosphorus 4.0 2.5 - 4.9 mg/dL    Albumin 3.8 3.4 - 5.0 g/dL   CBC and Auto Differential   Result Value Ref Range    WBC 15.5 (H) 4.4 - 11.3 x10*3/uL    nRBC 0.0 0.0 - 0.0 /100 WBCs    RBC 5.04 4.50 - 5.90 x10*6/uL    Hemoglobin 14.9 13.5 - 17.5 g/dL    Hematocrit 42.3 41.0 - 52.0 %    MCV 84 80 - 100 fL    MCH 29.6 26.0 - 34.0 pg    MCHC 35.2 32.0 - 36.0 g/dL    RDW 13.7 11.5 - 14.5 %    Platelets 233 150 - 450 x10*3/uL    Neutrophils % 91.7 40.0 - 80.0 %    Immature Granulocytes %, Automated 0.6 0.0 - 0.9 %    Lymphocytes % 5.5 13.0 - 44.0 %    Monocytes % 2.1 2.0 - 10.0 %    Eosinophils % 0.0 0.0 - 6.0 %    Basophils % 0.1 0.0 - 2.0 %    Neutrophils Absolute 14.16 (H) 1.20 - 7.70 x10*3/uL    Immature Granulocytes Absolute, Automated 0.10 0.00 - 0.70 x10*3/uL    Lymphocytes Absolute 0.85 (L) 1.20 - 4.80 x10*3/uL    Monocytes Absolute 0.33 0.10 - 1.00 x10*3/uL    Eosinophils Absolute 0.00 0.00 - 0.70 x10*3/uL    Basophils Absolute 0.01 0.00 - 0.10 x10*3/uL      Assessment  Javed Gil is a 42 y.o. male who is admitted to OSH, status post cricothyrotomy x2 due to inability to orotracheally intubate.  Patient life flighted to [facility] MICU for urgent airway evaluation status post stabilization of airway in the OR with Dr. Sherwood in conversion back to a oral tracheal ETT.    Patient edema was noted to be significantly improved on Direct laryngoscopy. So, decision was made to proceed with transoral intubation without proceeding with formal trach. 6-0 MLT placed and secured at 24 at the teeth and the cricothyroidotomy site closed in 3 layers     -Please leave patient intubated for 48 hours total  -Per ENT attending, ENT does not need to be present for extubation due to lack of significant swelling the operating room via laryngoscope  -Continue with Angioedema cocktail and Unasyn pert ICU team  -Incision care: cleanse with gentle soapy water and apply bacitracin twice a day   -6-0 proximal XLT and wound tray kit left at the bedside for emergency purposes    Seen and discussed with Dr. Marroquin who agrees with assessment and plan.    Note not final until signed by an attending.     Sergio Jennings, PGY2  Otolaryngology - Head & Neck Surgery  ENT Consult pager: [phone]  Peds pager: [phone]  Adult Head & Neck Phone: [phone]  ENT subspecialty team: Frida individual resident who wrote today's note  Please page if urgent    I am the day consult resident. I can only be contacted from 6am to 5pm M-F. Page on weekends or off hours.

## 2023-11-10 NOTE — PROCEDURES
Bronchoscopy    Date/Time: 11/10/2023 5:08 PM    Performed by: Theodora Camejo MD  Authorized by: Theodora Camejo MD    Consent:     Consent obtained:  Written    Consent given by: Uncle: Bo Shah.    Risks, benefits, and alternatives were discussed: yes      Risks discussed:  Bleeding and infection    Alternatives discussed:  No treatment  Universal protocol:     Patient identity confirmed:  Arm band  Indications:     Indications:  Hemoptysis  Sedation:     Sedation type:  Deep  Anesthesia:     Anesthesia method:  Local infiltration    Local anesthetic:  Lidocaine 1% w/o epi  Procedure specific details:      Equipment: Ambu aScope (regular)  Complications: none  Entry: A bronchoscope was lubricated and introduced through the indwelling ETT in the standard fashion.  Specimens: RML BAL  Findings:  - Larynx: unable to assess (intubated)  - Tube: clean without bioburden  - Trachea: no secretions, edema, and the site of the previous cric looks healed.  - R mainstem: minimal amount of white secretions   - RUL: no secretions  - RML: Old bloody secretion (small amount) that was cleaned. No active bleeding. BAL was done, 55 ml of NS were instilled and 10 ml of pink secretion were suctioned back.  - RLL: Old bloody secretion (small amount) that was cleaned. No active bleeding.  - L mainstem: minimal secretions, suctioned.  - BARBIE: no secretions  - LLL: minimal secretions, suctioned.  - Lingula: no secretions    BAL sample was sent for bacterial, mycobacterial and fungal stains and cultures, cytology, and cell count and diff.     Post-procedure details:     Procedure completion:  Tolerated well, no immediate complications

## 2023-11-10 NOTE — CARE PLAN
Problem: Safety - Medical Restraint  Goal: Remains free of injury from restraints (Restraint for Interference with Medical Device)  Outcome: Progressing     Problem: Safety - Medical Restraint  Goal: Free from restraint(s) (Restraint for Interference with Medical Device)  Outcome: Progressing   The patient's goals for the shift include      The clinical goals for the shift include patient will maintain patent airway    Over the shift, the patient did not make progress toward the following goals. Barriers to progression include . Recommendations to address these barriers include .

## 2023-11-10 NOTE — SIGNIFICANT EVENT
Restraints indicated to maintain lines/tubes.  Alternative therapies have been attempted and have been ineffective.  Restrain with soft wrist restraints and side rails up x4 until medical devices discontinued and/or patient able to participate with plan of care.

## 2023-11-11 LAB
ALBUMIN SERPL BCP-MCNC: 4 G/DL (ref 3.4–5)
ANION GAP SERPL CALC-SCNC: 18 MMOL/L (ref 10–20)
APPEARANCE UR: ABNORMAL
APTT PPP: 27 SECONDS (ref 27–38)
BASOPHILS # BLD AUTO: 0.02 X10*3/UL (ref 0–0.1)
BASOPHILS NFR BLD AUTO: 0.1 %
BILIRUB UR STRIP.AUTO-MCNC: NEGATIVE MG/DL
BUN SERPL-MCNC: 21 MG/DL (ref 6–23)
CALCIUM SERPL-MCNC: 8.8 MG/DL (ref 8.6–10.6)
CARDIAC TROPONIN I PNL SERPL HS: 16 NG/L (ref 0–53)
CHLORIDE SERPL-SCNC: 107 MMOL/L (ref 98–107)
CO2 SERPL-SCNC: 23 MMOL/L (ref 21–32)
COLOR UR: YELLOW
CREAT SERPL-MCNC: 0.81 MG/DL (ref 0.5–1.3)
EOSINOPHIL # BLD AUTO: 0 X10*3/UL (ref 0–0.7)
EOSINOPHIL NFR BLD AUTO: 0 %
ERYTHROCYTE [DISTWIDTH] IN BLOOD BY AUTOMATED COUNT: 13.3 % (ref 11.5–14.5)
GFR SERPL CREATININE-BSD FRML MDRD: >90 ML/MIN/1.73M*2
GLUCOSE BLD MANUAL STRIP-MCNC: 123 MG/DL (ref 74–99)
GLUCOSE BLD MANUAL STRIP-MCNC: 132 MG/DL (ref 74–99)
GLUCOSE BLD MANUAL STRIP-MCNC: 134 MG/DL (ref 74–99)
GLUCOSE SERPL-MCNC: 113 MG/DL (ref 74–99)
GLUCOSE UR STRIP.AUTO-MCNC: NEGATIVE MG/DL
HCT VFR BLD AUTO: 43.3 % (ref 41–52)
HGB BLD-MCNC: 15.8 G/DL (ref 13.5–17.5)
IMM GRANULOCYTES # BLD AUTO: 0.1 X10*3/UL (ref 0–0.7)
IMM GRANULOCYTES NFR BLD AUTO: 0.5 % (ref 0–0.9)
INR PPP: 1.1 (ref 0.9–1.1)
KETONES UR STRIP.AUTO-MCNC: ABNORMAL MG/DL
LEUKOCYTE ESTERASE UR QL STRIP.AUTO: NEGATIVE
LYMPHOCYTES # BLD AUTO: 1.69 X10*3/UL (ref 1.2–4.8)
LYMPHOCYTES NFR BLD AUTO: 8.5 %
MAGNESIUM SERPL-MCNC: 2.42 MG/DL (ref 1.6–2.4)
MCH RBC QN AUTO: 29 PG (ref 26–34)
MCHC RBC AUTO-ENTMCNC: 36.5 G/DL (ref 32–36)
MCV RBC AUTO: 79 FL (ref 80–100)
MONOCYTES # BLD AUTO: 0.99 X10*3/UL (ref 0.1–1)
MONOCYTES NFR BLD AUTO: 5 %
NEUTROPHILS # BLD AUTO: 17.18 X10*3/UL (ref 1.2–7.7)
NEUTROPHILS NFR BLD AUTO: 85.9 %
NITRITE UR QL STRIP.AUTO: NEGATIVE
NRBC BLD-RTO: 0 /100 WBCS (ref 0–0)
PH UR STRIP.AUTO: 8 [PH]
PHOSPHATE SERPL-MCNC: 3.1 MG/DL (ref 2.5–4.9)
PLATELET # BLD AUTO: 250 X10*3/UL (ref 150–450)
POTASSIUM SERPL-SCNC: 3.7 MMOL/L (ref 3.5–5.3)
PROT UR STRIP.AUTO-MCNC: ABNORMAL MG/DL
PROTHROMBIN TIME: 12.1 SECONDS (ref 9.8–12.8)
RBC # BLD AUTO: 5.45 X10*6/UL (ref 4.5–5.9)
RBC # UR STRIP.AUTO: ABNORMAL /UL
RBC #/AREA URNS AUTO: >20 /HPF
SODIUM SERPL-SCNC: 144 MMOL/L (ref 136–145)
SP GR UR STRIP.AUTO: 1.03
UROBILINOGEN UR STRIP.AUTO-MCNC: 2 MG/DL
WBC # BLD AUTO: 20 X10*3/UL (ref 4.4–11.3)
WBC #/AREA URNS AUTO: ABNORMAL /HPF

## 2023-11-11 PROCEDURE — 85025 COMPLETE CBC W/AUTO DIFF WBC: CPT | Performed by: STUDENT IN AN ORGANIZED HEALTH CARE EDUCATION/TRAINING PROGRAM

## 2023-11-11 PROCEDURE — 2500000004 HC RX 250 GENERAL PHARMACY W/ HCPCS (ALT 636 FOR OP/ED)

## 2023-11-11 PROCEDURE — 2500000004 HC RX 250 GENERAL PHARMACY W/ HCPCS (ALT 636 FOR OP/ED): Performed by: STUDENT IN AN ORGANIZED HEALTH CARE EDUCATION/TRAINING PROGRAM

## 2023-11-11 PROCEDURE — 36415 COLL VENOUS BLD VENIPUNCTURE: CPT | Performed by: STUDENT IN AN ORGANIZED HEALTH CARE EDUCATION/TRAINING PROGRAM

## 2023-11-11 PROCEDURE — 83735 ASSAY OF MAGNESIUM: CPT | Performed by: STUDENT IN AN ORGANIZED HEALTH CARE EDUCATION/TRAINING PROGRAM

## 2023-11-11 PROCEDURE — 82947 ASSAY GLUCOSE BLOOD QUANT: CPT

## 2023-11-11 PROCEDURE — 81003 URINALYSIS AUTO W/O SCOPE: CPT

## 2023-11-11 PROCEDURE — 2500000004 HC RX 250 GENERAL PHARMACY W/ HCPCS (ALT 636 FOR OP/ED): Performed by: NURSE PRACTITIONER

## 2023-11-11 PROCEDURE — 92610 EVALUATE SWALLOWING FUNCTION: CPT | Mod: GN | Performed by: SPEECH-LANGUAGE PATHOLOGIST

## 2023-11-11 PROCEDURE — 99223 1ST HOSP IP/OBS HIGH 75: CPT | Performed by: INTERNAL MEDICINE

## 2023-11-11 PROCEDURE — 2500000001 HC RX 250 WO HCPCS SELF ADMINISTERED DRUGS (ALT 637 FOR MEDICARE OP)

## 2023-11-11 PROCEDURE — 85730 THROMBOPLASTIN TIME PARTIAL: CPT | Performed by: STUDENT IN AN ORGANIZED HEALTH CARE EDUCATION/TRAINING PROGRAM

## 2023-11-11 PROCEDURE — 80069 RENAL FUNCTION PANEL: CPT | Performed by: STUDENT IN AN ORGANIZED HEALTH CARE EDUCATION/TRAINING PROGRAM

## 2023-11-11 PROCEDURE — C9113 INJ PANTOPRAZOLE SODIUM, VIA: HCPCS | Performed by: STUDENT IN AN ORGANIZED HEALTH CARE EDUCATION/TRAINING PROGRAM

## 2023-11-11 PROCEDURE — 94640 AIRWAY INHALATION TREATMENT: CPT

## 2023-11-11 PROCEDURE — 84484 ASSAY OF TROPONIN QUANT: CPT | Performed by: STUDENT IN AN ORGANIZED HEALTH CARE EDUCATION/TRAINING PROGRAM

## 2023-11-11 PROCEDURE — 51701 INSERT BLADDER CATHETER: CPT

## 2023-11-11 PROCEDURE — 99291 CRITICAL CARE FIRST HOUR: CPT | Performed by: NURSE PRACTITIONER

## 2023-11-11 PROCEDURE — 1200000002 HC GENERAL ROOM WITH TELEMETRY DAILY

## 2023-11-11 PROCEDURE — 2500000002 HC RX 250 W HCPCS SELF ADMINISTERED DRUGS (ALT 637 FOR MEDICARE OP, ALT 636 FOR OP/ED)

## 2023-11-11 RX ORDER — ATROPINE SULFATE 0.1 MG/ML
INJECTION INTRAVENOUS
Status: DISPENSED
Start: 2023-11-11 | End: 2023-11-11

## 2023-11-11 RX ORDER — POTASSIUM CHLORIDE 14.9 MG/ML
20 INJECTION INTRAVENOUS
Status: COMPLETED | OUTPATIENT
Start: 2023-11-11 | End: 2023-11-11

## 2023-11-11 RX ORDER — AMLODIPINE BESYLATE 5 MG/1
5 TABLET ORAL DAILY
Status: DISCONTINUED | OUTPATIENT
Start: 2023-11-11 | End: 2023-11-12

## 2023-11-11 RX ORDER — KETOROLAC TROMETHAMINE 30 MG/ML
15 INJECTION, SOLUTION INTRAMUSCULAR; INTRAVENOUS EVERY 6 HOURS PRN
Status: DISPENSED | OUTPATIENT
Start: 2023-11-11 | End: 2023-11-12

## 2023-11-11 RX ORDER — KETOROLAC TROMETHAMINE 30 MG/ML
30 INJECTION, SOLUTION INTRAMUSCULAR; INTRAVENOUS EVERY 6 HOURS PRN
Status: DISCONTINUED | OUTPATIENT
Start: 2023-11-11 | End: 2023-11-11

## 2023-11-11 RX ORDER — ACETAMINOPHEN 325 MG/1
650 TABLET ORAL EVERY 6 HOURS PRN
Status: DISCONTINUED | OUTPATIENT
Start: 2023-11-11 | End: 2023-11-12 | Stop reason: HOSPADM

## 2023-11-11 RX ORDER — HYDRALAZINE HYDROCHLORIDE 25 MG/1
25 TABLET, FILM COATED ORAL EVERY 8 HOURS PRN
Status: DISCONTINUED | OUTPATIENT
Start: 2023-11-11 | End: 2023-11-12

## 2023-11-11 RX ORDER — ACETAMINOPHEN 10 MG/ML
1000 INJECTION, SOLUTION INTRAVENOUS ONCE
Status: COMPLETED | OUTPATIENT
Start: 2023-11-11 | End: 2023-11-11

## 2023-11-11 RX ORDER — POTASSIUM CHLORIDE 1.5 G/1.58G
40 POWDER, FOR SOLUTION ORAL ONCE
Status: DISCONTINUED | OUTPATIENT
Start: 2023-11-11 | End: 2023-11-11

## 2023-11-11 RX ADMIN — DEXAMETHASONE SODIUM PHOSPHATE 6 MG: 10 INJECTION INTRAMUSCULAR; INTRAVENOUS at 08:27

## 2023-11-11 RX ADMIN — AMLODIPINE BESYLATE 5 MG: 5 TABLET ORAL at 14:16

## 2023-11-11 RX ADMIN — POTASSIUM CHLORIDE 20 MEQ: 14.9 INJECTION, SOLUTION INTRAVENOUS at 08:30

## 2023-11-11 RX ADMIN — DEXAMETHASONE SODIUM PHOSPHATE 6 MG: 10 INJECTION INTRAMUSCULAR; INTRAVENOUS at 02:13

## 2023-11-11 RX ADMIN — PANTOPRAZOLE SODIUM 40 MG: 40 INJECTION, POWDER, FOR SOLUTION INTRAVENOUS at 08:27

## 2023-11-11 RX ADMIN — KETOROLAC TROMETHAMINE 30 MG: 30 INJECTION, SOLUTION INTRAMUSCULAR; INTRAVENOUS at 10:00

## 2023-11-11 RX ADMIN — KETOROLAC TROMETHAMINE 15 MG: 30 INJECTION, SOLUTION INTRAMUSCULAR; INTRAVENOUS at 19:34

## 2023-11-11 RX ADMIN — ACETAMINOPHEN 1000 MG: 1000 INJECTION INTRAVENOUS at 11:56

## 2023-11-11 RX ADMIN — POTASSIUM CHLORIDE 20 MEQ: 14.9 INJECTION, SOLUTION INTRAVENOUS at 10:26

## 2023-11-11 RX ADMIN — ALBUTEROL SULFATE 2 PUFF: 90 AEROSOL, METERED RESPIRATORY (INHALATION) at 02:55

## 2023-11-11 RX ADMIN — HYDRALAZINE HYDROCHLORIDE 25 MG: 25 TABLET, FILM COATED ORAL at 14:16

## 2023-11-11 ASSESSMENT — COGNITIVE AND FUNCTIONAL STATUS - GENERAL
DRESSING REGULAR LOWER BODY CLOTHING: A LITTLE
CLIMB 3 TO 5 STEPS WITH RAILING: A LOT
HELP NEEDED FOR BATHING: A LITTLE
MOVING FROM LYING ON BACK TO SITTING ON SIDE OF FLAT BED WITH BEDRAILS: A LITTLE
DAILY ACTIVITIY SCORE: 19
MOBILITY SCORE: 17
MOVING TO AND FROM BED TO CHAIR: A LITTLE
PERSONAL GROOMING: A LITTLE
WALKING IN HOSPITAL ROOM: A LITTLE
STANDING UP FROM CHAIR USING ARMS: A LITTLE
DRESSING REGULAR UPPER BODY CLOTHING: A LITTLE
TURNING FROM BACK TO SIDE WHILE IN FLAT BAD: A LITTLE
TOILETING: A LITTLE

## 2023-11-11 ASSESSMENT — PAIN SCALES - GENERAL
PAINLEVEL_OUTOF10: 6
PAINLEVEL_OUTOF10: 5 - MODERATE PAIN
PAINLEVEL_OUTOF10: 7
PAINLEVEL_OUTOF10: 6

## 2023-11-11 ASSESSMENT — PAIN - FUNCTIONAL ASSESSMENT
PAIN_FUNCTIONAL_ASSESSMENT: 0-10
PAIN_FUNCTIONAL_ASSESSMENT: 0-10

## 2023-11-11 NOTE — CARE PLAN
Problem: Knowledge Deficit  Goal: Patient/family/caregiver demonstrates understanding of disease process, treatment plan, medications, and discharge instructions  Outcome: Progressing     Problem: Mechanical Ventilation  Goal: Patient Will Maintain Patent Airway  Outcome: Progressing  Goal: Tracheostomy will be managed safely  Outcome: Progressing  Goal: ET tube will be managed safely  Outcome: Progressing  Goal: Ability to express needs and understand communication  Outcome: Progressing  Goal: Mobility/activity is maintained at optimum level for patient  Outcome: Progressing     Problem: Pain - Adult  Goal: Verbalizes/displays adequate comfort level or baseline comfort level  Outcome: Progressing     Problem: Safety - Adult  Goal: Free from fall injury  Outcome: Progressing     Problem: Nutrition  Goal: Less than 5 days NPO/clear liquids  Outcome: Progressing  Goal: Oral intake greater than 50%  Outcome: Progressing  Goal: Oral intake greater 75%  Outcome: Progressing  Goal: Consume prescribed supplement  Outcome: Progressing  Goal: Adequate PO fluid intake  Outcome: Progressing  Goal: Nutrition support goals are met within 48 hrs  Outcome: Progressing  Goal: Nutrition support is meeting 75% of nutrient needs  Outcome: Progressing  Goal: Tube feed tolerance  Outcome: Progressing  Goal: BG  mg/dL  Outcome: Progressing  Goal: Lab values WNL  Outcome: Progressing  Goal: Electrolytes WNL  Outcome: Progressing  Goal: Promote healing  Outcome: Progressing  Goal: Maintain stable weight  Outcome: Progressing  Goal: Reduce weight from edema/fluid  Outcome: Progressing  Goal: Gradual weight gain  Outcome: Progressing  Goal: Improve ostomy output  Outcome: Progressing     Problem: Skin  Goal: Decreased wound size/increased tissue granulation at next dressing change  Outcome: Progressing  Goal: Participates in plan/prevention/treatment measures  Outcome: Progressing  Goal: Promote/optimize nutrition  Outcome:  Progressing  Goal: Promote skin healing  Outcome: Progressing     Problem: Pain  Goal: Takes deep breaths with improved pain control throughout the shift  Outcome: Progressing  Goal: Turns in bed with improved pain control throughout the shift  Outcome: Progressing  Goal: Walks with improved pain control throughout the shift  Outcome: Progressing  Goal: Performs ADL's with improved pain control throughout shift  Outcome: Progressing  Goal: Participates in PT with improved pain control throughout the shift  Outcome: Progressing  Goal: Free from opioid side effects throughout the shift  Outcome: Progressing  Goal: Free from acute confusion related to pain meds throughout the shift  Outcome: Progressing   The patient's goals for the shift include      The clinical goals for the shift include patient will maintain patent airway

## 2023-11-11 NOTE — PROGRESS NOTES
Javed Gil is a 42 y.o. male on day 3 of admission presenting with Angioedema, initial encounter.    Subjective     Patient is a 41 y/o M with PMHx of HTN who presented to OSH with ACE-I induced angioedema.    While in the OSH ED the patient became increasingly dyspneic, bradycardic and unresponsive with inability to protect his airway.  At that time it was elected by the ED physician to attempt to intubate the patient but upon first attempt the tube was coughed out by the patient and then with increasing edema noted on visualization with glidoscope a crack of cricoidectomy was performed at the bedside by the ED physician successfully.Patient also received icatibant/Solu-Medrol with regards to his angioedema.  Chest x-ray noted bilateral airspace opacities concerning for multifocal pneumonia and emphysematous changes within the soft tissues and visualized neck with linear lucencies extending into the mediastinum suggestive of numerous mediastinum given the recent cricoid.  Despite being on propofol and fentanyl,  patient was restless. While in ICU patient had violent cough leading to displacement of tube  and causing respiratory distress and subcutaneous emphysema. Rapid response was called and critical thyrotomy was extended and placed on new ET tube.  Patient condition as well as saturation improved with new tube placement.  It was then elected to start the patient on a Versed drip and continue him on a fentanyl drip with the goal of removing the propofol drip.  Patient transferred to [facility] for ENT service.      Patient was transferred to  MICU, then taken to OR with ENT. ENT noted that angioedema was not severe enough to merit tracheostomy. Patient was orally intubated, cricothyroidotomy site sutured. F/up cxr suggestive of pneumothorax and pneumomedistinum which improved with 100% FiO2. MICU course was complicated by hypertension which was controlled with PRN hydralazine IV and sinus  "bradycardia for which EP was consulted. Patient was extubated on 11/10, stable on RA, and transferred to [facility].        Objective     Physical Exam  Constitutional:       General: He is not in acute distress.  Eyes:      General: No scleral icterus.     Extraocular Movements: Extraocular movements intact.      Pupils: Pupils are equal, round, and reactive to light.   Cardiovascular:      Rate and Rhythm: Regular rhythm. Bradycardia present.      Heart sounds: Normal heart sounds.   Pulmonary:      Effort: Pulmonary effort is normal. No respiratory distress.   Abdominal:      General: There is no distension.      Palpations: Abdomen is soft.      Tenderness: There is no guarding or rebound.   Musculoskeletal:      Cervical back: No tenderness.      Right lower leg: No edema.      Left lower leg: No edema.   Lymphadenopathy:      Cervical: No cervical adenopathy.   Neurological:      General: No focal deficit present.      Mental Status: He is alert.         Last Recorded Vitals  Blood pressure 161/80, pulse (!) 46, temperature 37.2 °C (99 °F), temperature source Skin, resp. rate 20, height 1.829 m (6' 0.01\"), weight 106 kg (233 lb 14.5 oz), SpO2 95 %.  Intake/Output last 3 Shifts:  I/O last 3 completed shifts:  In: 1792.8 (16.9 mL/kg) [I.V.:1492.8 (14.1 mL/kg); IV Piggyback:300]  Out: 4550 (42.9 mL/kg) [Urine:4550 (1.2 mL/kg/hr)]  Weight: 106.1 kg     Relevant Results  Results for orders placed or performed during the hospital encounter of 11/08/23 (from the past 24 hour(s))   Respiratory Culture/Smear    Specimen: BAL; Fluid   Result Value Ref Range    Respiratory Culture/Smear No growth to date     Gram Stain No polymorphonuclear leukocytes seen     Gram Stain No organisms seen    Body Fluid Cell Count   Result Value Ref Range    Color, Fluid Pink (A) Colorless, Straw, Yellow    Clarity, Fluid Cloudy (A) Clear    WBC, Fluid 108 See Comment /uL    RBC, Fluid 7,000 0  /uL /uL   Body Fluid Differential   Result Value Ref " Range    Neutrophils %, Manual, Fluid 15 <25 % %    Lymphocytes %, Manual, Fluid 2 <75 % %    Mono/Macrophages %, Manual, Fluid 83 <70 % %    Eosinophils %, Manual, Fluid 0 0 % %    Basophils %, Manual, Fluid 0 0 % %    Immature Granulocytes %, Manual, Fluid 0 0 % %    Blasts %, Manual, Fluid 0 0 % %    Unclassified Cells %, Manual, Fluid 0 0 % %    Plasma Cells %, Manual, Fluid 0 0 % %    Total Cells Counted, Fluid 100    POCT GLUCOSE   Result Value Ref Range    POCT Glucose 117 (H) 74 - 99 mg/dL   POCT GLUCOSE   Result Value Ref Range    POCT Glucose 124 (H) 74 - 99 mg/dL   Renal Function Panel   Result Value Ref Range    Glucose 113 (H) 74 - 99 mg/dL    Sodium 144 136 - 145 mmol/L    Potassium 3.7 3.5 - 5.3 mmol/L    Chloride 107 98 - 107 mmol/L    Bicarbonate 23 21 - 32 mmol/L    Anion Gap 18 10 - 20 mmol/L    Urea Nitrogen 21 6 - 23 mg/dL    Creatinine 0.81 0.50 - 1.30 mg/dL    eGFR >90 >60 mL/min/1.73m*2    Calcium 8.8 8.6 - 10.6 mg/dL    Phosphorus 3.1 2.5 - 4.9 mg/dL    Albumin 4.0 3.4 - 5.0 g/dL   Magnesium   Result Value Ref Range    Magnesium 2.42 (H) 1.60 - 2.40 mg/dL   CBC and Auto Differential   Result Value Ref Range    WBC 20.0 (H) 4.4 - 11.3 x10*3/uL    nRBC 0.0 0.0 - 0.0 /100 WBCs    RBC 5.45 4.50 - 5.90 x10*6/uL    Hemoglobin 15.8 13.5 - 17.5 g/dL    Hematocrit 43.3 41.0 - 52.0 %    MCV 79 (L) 80 - 100 fL    MCH 29.0 26.0 - 34.0 pg    MCHC 36.5 (H) 32.0 - 36.0 g/dL    RDW 13.3 11.5 - 14.5 %    Platelets 250 150 - 450 x10*3/uL    Neutrophils % 85.9 40.0 - 80.0 %    Immature Granulocytes %, Automated 0.5 0.0 - 0.9 %    Lymphocytes % 8.5 13.0 - 44.0 %    Monocytes % 5.0 2.0 - 10.0 %    Eosinophils % 0.0 0.0 - 6.0 %    Basophils % 0.1 0.0 - 2.0 %    Neutrophils Absolute 17.18 (H) 1.20 - 7.70 x10*3/uL    Immature Granulocytes Absolute, Automated 0.10 0.00 - 0.70 x10*3/uL    Lymphocytes Absolute 1.69 1.20 - 4.80 x10*3/uL    Monocytes Absolute 0.99 0.10 - 1.00 x10*3/uL    Eosinophils Absolute 0.00 0.00 -  0.70 x10*3/uL    Basophils Absolute 0.02 0.00 - 0.10 x10*3/uL   Troponin I, High Sensitivity   Result Value Ref Range    Troponin I, High Sensitivity 16 0 - 53 ng/L   Coagulation Screen   Result Value Ref Range    Protime 12.1 9.8 - 12.8 seconds    INR 1.1 0.9 - 1.1    aPTT 27 27 - 38 seconds   POCT GLUCOSE   Result Value Ref Range    POCT Glucose 123 (H) 74 - 99 mg/dL   POCT GLUCOSE   Result Value Ref Range    POCT Glucose 134 (H) 74 - 99 mg/dL   POCT GLUCOSE   Result Value Ref Range    POCT Glucose 132 (H) 74 - 99 mg/dL     Scheduled medications  amLODIPine, 5 mg, oral, Daily  enoxaparin, 40 mg, subcutaneous, Daily      Continuous medications     PRN medications  PRN medications: acetaminophen, albuterol, hydrALAZINE, ketorolac                      Assessment/Plan     42 y.o. M who has been on lisinopril for 1 year presented for angioedema s/p icatibant x1, then cricothyrotomy, which was closed by ENT, extubated 11/10. Hospital course complicated by hypertension and sinus bradycardia.      #Acute hypoxemia respiratory failure 2/2 angioedema due to allergy to lisinopril, resolved   - s/p icatibant 1 dose, solu-Medrol/ FFP x2, s/p OR with ENT. Cricothyroidotomy site close. Now extubated on 11/10. C/b R apical pneumothorax resolved, Hemoptysis (resolved), s/p bronch on 11/10 no evidence of any bleeding   S/p Decadron (stop 11/11)  - wean o2 per sat>92%, now on RA  - follow up with bronch cultures     #HTN  - Amlodipine 5 mg PO daily for HTN   - Hydralazine 25 mg PO q8h PRN SBP >180  - Avoid B-blockade given bradycardia     #Bradycardia  - ECG demonstrating sinus rhythm and pt appears to have chronotropic competence. Pt currently asymptomatic.   - EP consult by MICU team   - Monitor on tele     #Leukocytosis   - Likely due to decadron. Low suspicious for infection.   - Abx:    Unasyn (11/8-11/10)  - Cultures: follow up with bronch cx, preliminary negative      #Postextubation dysphagia  - S/p SLP evaluation, passed  11/11  - Diet regular      F: PRN  E: PRN  N: regular diet   A: pIV x2  DVT lovenox no need for ppi      Code Status: FULL CODE    NOK: Sister, Mary Kramer [phone]   Can also contact Jerome griffin [phone]       Sam Akers MD

## 2023-11-11 NOTE — PROGRESS NOTES
Critical Care Daily Progress      Subjective   Patient is a 42 y.o. male admitted on 11/8/2023 10:31 AM with the following indication(s) for ICU care for angioedema s/p cricothyrotomy ENT consult and switch to ETT, now extubated. Course complicated by R apical pneumothorax (resolved), Bradycardia, asymptomatic and normotensive.     Interval History:   No acute events overnight Pt on 2L NC     Complaints: has none..   Review of Systems  Physical Exam    Scheduled Medications:   amLODIPine, 5 mg, oral, Daily  atropine, , ,   enoxaparin, 40 mg, subcutaneous, Daily         Continuous Medications:         PRN Medications:   PRN medications: acetaminophen, albuterol, atropine, ketorolac, oxygen    Objective   Vitals:  Most Recent:  Vitals:    11/11/23 1200   BP: 170/86   Pulse: (!) 46   Resp: 18   Temp:    SpO2: 98%       24hr Min/Max:  Temp  Min: 35.8 °C (96.4 °F)  Max: 37.5 °C (99.5 °F)  Pulse  Min: 37  Max: 84  BP  Min: 143/77  Max: 207/106  Resp  Min: 11  Max: 32  SpO2  Min: 95 %  Max: 100 %    LDA:         Vent settings:  Vent Mode: Volume control/assist control  FiO2 (%):  [40 %] 40 %  S RR:  [15] 15  S VT:  [450 mL] 450 mL  PEEP/CPAP (cm H2O):  [5 cm H20] 5 cm H20  MAP (cm H2O):  [14] 14    Hemodynamic parameters for last 24 hours:       I/O:    Intake/Output Summary (Last 24 hours) at 11/11/2023 1240  Last data filed at 11/11/2023 1156  Gross per 24 hour   Intake 742.7 ml   Output 1475 ml   Net -732.3 ml       Physical Exam:   Physical Exam:Constitutional: pt alert and oriented x 3 calm and cooperative  Eyes: PERRL, R sclera (red, no drainage)   ENMT: mucous membranes moist, Lower lip with scab   Head/Neck: NC/AT  Respiratory/Thorax: clear upper lobes diminished BB   Cardiovascular: HR 40s, Regular rhythm s1/s2 no s3/s4 no gmr   Gastrointestinal: Nondistended, soft, non-tender, BS present x 4  Musculoskeletal: ROM intact, no joint swelling, normal strength  Extremities: normal extremities, no edema  Neurological:  alert and oriented x 3, speech clear  Skin: Warm and dry, no lesions, no rashes    Lab/Radiology/Diagnostic Review:  [unfilled]  Results for orders placed or performed during the hospital encounter of 11/08/23 (from the past 24 hour(s))   Respiratory Culture/Smear    Specimen: SPUTUM; Fluid   Result Value Ref Range    Respiratory Culture/Smear No growth to date     Gram Stain       Gram stain indicates specimen consists of lower respiratory tract secretions.    Gram Stain No predominant organism    POCT GLUCOSE   Result Value Ref Range    POCT Glucose 136 (H) 74 - 99 mg/dL   Respiratory Culture/Smear    Specimen: BAL; Fluid   Result Value Ref Range    Respiratory Culture/Smear No growth to date     Gram Stain No polymorphonuclear leukocytes seen     Gram Stain No organisms seen    Body Fluid Cell Count   Result Value Ref Range    Color, Fluid Pink (A) Colorless, Straw, Yellow    Clarity, Fluid Cloudy (A) Clear    WBC, Fluid 108 See Comment /uL    RBC, Fluid 7,000 0  /uL /uL   Body Fluid Differential   Result Value Ref Range    Neutrophils %, Manual, Fluid 15 <25 % %    Lymphocytes %, Manual, Fluid 2 <75 % %    Mono/Macrophages %, Manual, Fluid 83 <70 % %    Eosinophils %, Manual, Fluid 0 0 % %    Basophils %, Manual, Fluid 0 0 % %    Immature Granulocytes %, Manual, Fluid 0 0 % %    Blasts %, Manual, Fluid 0 0 % %    Unclassified Cells %, Manual, Fluid 0 0 % %    Plasma Cells %, Manual, Fluid 0 0 % %    Total Cells Counted, Fluid 100    POCT GLUCOSE   Result Value Ref Range    POCT Glucose 117 (H) 74 - 99 mg/dL   POCT GLUCOSE   Result Value Ref Range    POCT Glucose 124 (H) 74 - 99 mg/dL   Renal Function Panel   Result Value Ref Range    Glucose 113 (H) 74 - 99 mg/dL    Sodium 144 136 - 145 mmol/L    Potassium 3.7 3.5 - 5.3 mmol/L    Chloride 107 98 - 107 mmol/L    Bicarbonate 23 21 - 32 mmol/L    Anion Gap 18 10 - 20 mmol/L    Urea Nitrogen 21 6 - 23 mg/dL    Creatinine 0.81 0.50 - 1.30 mg/dL    eGFR >90 >60  mL/min/1.73m*2    Calcium 8.8 8.6 - 10.6 mg/dL    Phosphorus 3.1 2.5 - 4.9 mg/dL    Albumin 4.0 3.4 - 5.0 g/dL   Magnesium   Result Value Ref Range    Magnesium 2.42 (H) 1.60 - 2.40 mg/dL   CBC and Auto Differential   Result Value Ref Range    WBC 20.0 (H) 4.4 - 11.3 x10*3/uL    nRBC 0.0 0.0 - 0.0 /100 WBCs    RBC 5.45 4.50 - 5.90 x10*6/uL    Hemoglobin 15.8 13.5 - 17.5 g/dL    Hematocrit 43.3 41.0 - 52.0 %    MCV 79 (L) 80 - 100 fL    MCH 29.0 26.0 - 34.0 pg    MCHC 36.5 (H) 32.0 - 36.0 g/dL    RDW 13.3 11.5 - 14.5 %    Platelets 250 150 - 450 x10*3/uL    Neutrophils % 85.9 40.0 - 80.0 %    Immature Granulocytes %, Automated 0.5 0.0 - 0.9 %    Lymphocytes % 8.5 13.0 - 44.0 %    Monocytes % 5.0 2.0 - 10.0 %    Eosinophils % 0.0 0.0 - 6.0 %    Basophils % 0.1 0.0 - 2.0 %    Neutrophils Absolute 17.18 (H) 1.20 - 7.70 x10*3/uL    Immature Granulocytes Absolute, Automated 0.10 0.00 - 0.70 x10*3/uL    Lymphocytes Absolute 1.69 1.20 - 4.80 x10*3/uL    Monocytes Absolute 0.99 0.10 - 1.00 x10*3/uL    Eosinophils Absolute 0.00 0.00 - 0.70 x10*3/uL    Basophils Absolute 0.02 0.00 - 0.10 x10*3/uL   Troponin I, High Sensitivity   Result Value Ref Range    Troponin I, High Sensitivity 16 0 - 53 ng/L   Coagulation Screen   Result Value Ref Range    Protime 12.1 9.8 - 12.8 seconds    INR 1.1 0.9 - 1.1    aPTT 27 27 - 38 seconds   POCT GLUCOSE   Result Value Ref Range    POCT Glucose 123 (H) 74 - 99 mg/dL   POCT GLUCOSE   Result Value Ref Range    POCT Glucose 134 (H) 74 - 99 mg/dL   POCT GLUCOSE   Result Value Ref Range    POCT Glucose 132 (H) 74 - 99 mg/dL       Assessment/Plan   Principal Problem:    Angioedema, initial encounter  Active Problems:    Acute respiratory failure with hypoxia and hypercapnia (CMS/HCC)    42 y.o. M who has been on lisinopril for 1 year presented for angioedema s/p icatibant x1, then cricothyrotomy, which was closed by ENT, extubated 11/10, pt now on 2l NC     Neuro: no active issues     MSK: c/o  generalized pain   S/p IV tylenol x 1 dose  - order ketaradol 30 mg then switch to 15 mg q6hrs for 24 hrs   - order acetaminophen prn     Resp: Acute hypoxemia respiratory failure 2/2 angioedema due to allergy to lisinopril s/p s/p cricothyrotomy performed by the ED physician, s/p icatibant 1 dose, solu-Medrol/ FFP x2, s/p OR with ENT. Cricothyroidotomy site close. Now extubated on 11/10. C/b R apical pneumothorax resolved, Hemoptysis (resolved), s/p bronch on 11/10 no evidence of any bleeding   S/p Decadron (stop 11/11)  - wean o2 per sat>92%  - follow up with bronch cultures      Cardiac: Angioedema due to lisinopril allergies. ED admission at OSH noted to have bradycardia with HR 40s, since admission pt with HR 40-50s. Pt asymptomatic, normotensive.   - order Norvasc 5 mg PO daily for HTN   - order ECG: concern for possible junctional vs bradycardia with HR 40s  - EP consult to evaluate Heart rate   - transfer on tele     ID: leukocytosis due to possible decadron. Low suspicious for infection.   - Abx: no need for abx   - Cultures: follow up with bronch cx, preliminary negative     FEN/GI: 11/10 Last BM  S/p SLP evaluation, passed 11/11  - Bowel Regimen: will need bowel regimen   - Diet regular     Renal: no active issues   - Daily wt and Strict I&Os    Heme: no active issues     Endo: no hx of DM or thyroid disease   Elevated TSH 5, normal T4  - Hypoglycemia protocol     P lovenox no need for ppi   N regular diet     CODE status full code     Dispo transfer to [facility] tele   Nodeejay is sister Mary Kramer [phone]   Can also contact uncle lu [phone]   I spent 60 minutes with the patient. 30 minutes of which was providing critical care for the patient.      Sabrina Mcguire, APRN-CNP

## 2023-11-11 NOTE — PROGRESS NOTES
Speech-Language Pathology    Inpatient Speech-Language Pathology Clinical Swallow Evaluation    Patient Name: Javed Gil  MRN: [id number]  Today's Date: 11/11/2023   Time Calculation  Start Time: 1010  Stop Time: 1040  Time Calculation (min): 30 min         Current Problem:   Javed Gil is a 42 y.o.  male with HTN, started on lisinopril one year ago, presenting with  presumed ACE-I induced Angioedema.       potop day three from conversion of Cric- to orotracheal intubation in the OR; extubated 11/10      Recommendations:  Solid Diet Recommendations : Regular (IDDSI Level 7)  Liquid Diet Recommendations: Thin (IDDSI Level 0)  Compensatory Swallowing Strategies: Upright 90 degrees as possible for all oral intake, Small bites/sips, Eat/feed slowly  Medication Administration Recommendations: Whole, Crushed, With Liquid, With Pureed      Assessment:  - Clinical bedside exam consisting of 3 oz Robyn protocol and several food textures including crackers and apple-sauce, tolerating all without s/sx of aspiration, suggesting WFL oropharyngeal swallow. Reviewed strategies for safe swallow including upright positioning and slow feeding rate. The pt and family were grateful and stated understanding.       Plan:  SLP Plan: Skilled SLP  SLP Frequency: 1x per week  Duration: 30 days  SLP Discharge Recommendations:  (TBD)  Diet Recommendations: Solid, Liquid  Solid Consistency: Regular (IDDSI Level 7)  Liquid Consistency: Thin (IDDSI Level 0)  Discussed POC: Patient, Caregiver/family  Discussed Risks/Benefits: Yes, Patient, Caregiver/Family  Patient/Caregiver Agreeable: Yes  SLP - OK to Discharge: Yes      Subjective   Current Problem:  A/O x  4, on room air. Vocal quality near baseline. Oral-motor function grossly intact; no obvious swelling per inspection.       General Visit Information:  Patient Class: Inpatient  Living Environment: Home  Arrival: Family/caregiver present  Ordering Physician:  Sabrina Mcguire  Reason for Referral: s/p extubation;  Prior Level of Function: WFL  Developmental Status: Age Appropriate  Date of Onset: 11/08/23  Date of Order: 11/11/23  [employer] Diet: regular  Current Diet : npo  Dysphagia Diagnosis: Within functional limits          Objective       Baseline Assessment:  Temperature Spikes Noted: No  Respiratory Status: Room air  History of Intubation: Yes  Length of Intubations (days): 2 days  Date extubated: 11/10/23  Behavior/Cognition: Alert, Cooperative, Pleasant mood  Vision: Functional for self-feeding  Hearing: Within Functional Limits  Patient Positioning: Upright in Bed  Baseline Vocal Quality: Normal  Volitional Cough: Strong  Volitional Swallow: Within Functional Limits          Oral/Motor Assessment:  Oral Hygiene: [facility]  Dentition: Adequate/Natural  Oral Motor: Within Functional Limits  Intelligibility: Intelligible  Hearing: Within Functional Limits      Consistencies Trialed:  Consistencies Trialed: Yes  Consistencies Trialed: Ice Chips, Thin (IDDSI Level 0) - Straw, Thin (IDDSI Level 0) - Spoon, Pureed/extremely thick (IDDSI Level 4), Regular (IDDSI Level 7)    The 3 oz sequential drinks of thin liquid water was utilized as a reliable, evidence based test to rule out silent aspiration and determine need for additional testing, such as the MBS or FEES (fiberoptic endoscopic evaluation of swallow), if the test is equivocal, incomplete or pt shows s/sx of aspiration,  prior to recommending a oral diet    Clinical Observations:  Patient Positioning: Upright in Bed  Management of Oral Secretions: Adequate  Was The 3 oz Swallow Protocol Completed: Yes  Clinical Observation Comment:  (Pt accepted several ice chips and sips of water via tsp and straw. He then successfully consumed 3 oz water x straw per [place] protocol. 1/2 cup of puree and several crackers were provided, tolerating without overt indicators of aspiration, showing timely)        Encounter Problems        Encounter Problems (Active)       Swallowing       LTG - Patient will tolerate the least restrictive diet consistency to allow for safe consumption of daily meals       Start:  11/11/23    Expected End:  11/18/23            SLP Goal 1       Start:  11/11/23    Expected End:  11/18/23       Pt will tolerate prescribed diet without s/s of aspiration on 80% of trials                  Outpatient Education:  Adult Outpatient Education  Individual(s) Educated: Patient, Other  Verbal Education : yes  Diagnosis and Precautions: yes  Risk and Benefits Discussed with Patient/Caregiver/Other: yes  Patient/Caregiver Demonstrated Understanding: yes  Plan of Care Discussed and Agreed Upon: yes  Patient Response to Education: Patient/Caregiver Verbalized Understanding of Information

## 2023-11-11 NOTE — CONSULTS
Inpatient consult to Electrophysiology  Consult performed by: Eric Lord MD  Consult ordered by: Jazlyn Staples MD  Reason for consult: bradycardia        History Of Present Illness:    Javed Gil is a 42 y.o. male with HTN presenting with angioedema found to have sinus bradycardia and ?junctional rhythm.     Pt was admitted to micu for hypoxemic rf 2/2 angioedema. He's been on ACEi for approximately 1 year, but developed acute SOB and was found to be hypoxic. In ED he was noted to have HR in 40s - the first time he's been told this. While in the MICU, he continued to have sinus bradycardia without any pauses. Telemetry from the MICU is not available for review. Pt is on room air, passed swallow test and is reportedly nearing discharge. On my interview he denies any presyncope or syncope, h/o palps or baseline bradycardia. He is able to augment HR to >90bpm with minimal exercise at the bedside.      Last Recorded Vitals:  Vitals:    11/11/23 1000 11/11/23 1100 11/11/23 1200 11/11/23 1307   BP: 172/78 (!) 186/83 170/86 (!) 204/92   Pulse: (!) 44 (!) 43 (!) 46 (!) 48   Resp: 17 16 18 20   Temp:  37.3 °C (99.1 °F)  37.2 °C (99 °F)   TempSrc:  Temporal     SpO2: 97% 97% 98% 95%   Weight:       Height:           Last Labs:  CBC - 11/11/2023:  3:13 AM  20.0 15.8 250    43.3      CMP - 11/11/2023:  3:13 AM  8.8 6.6 42 --- 0.6   3.1 4.0 20 37      PTT - 11/11/2023:  3:13 AM  1.1   12.1 27     Troponin I, High Sensitivity   Date/Time Value Ref Range Status   11/11/2023 03:13 AM 16 0 - 53 ng/L Final     Hemoglobin A1C   Date/Time Value Ref Range Status   11/08/2023 10:46 AM 5.4 see below % Final     Comment:     Hemoglobin variant detected which does not interfere with determination of Hemoglobin A1c. Hemoglobin identification can be ordered to characterize the variant if clinically indicated.     LDL Calculated   Date/Time Value Ref Range Status   11/08/2023 12:58  (H) <=99 mg/dL Final     Comment:  "                                Near   Borderline      AGE      Desirable  Optimal    High     High     Very High     0-19 Y     0 - 109     ---    110-129   >/= 130     ----    20-24 Y     0 - 119     ---    120-159   >/= 160     ----      >24 Y     0 -  99   100-129  130-159   160-189     >/=190       VLDL   Date/Time Value Ref Range Status   11/08/2023 12:58 PM 10 0 - 40 mg/dL Final      Last I/O:  I/O last 3 completed shifts:  In: 1792.8 (16.9 mL/kg) [I.V.:1492.8 (14.1 mL/kg); IV Piggyback:300]  Out: 4550 (42.9 mL/kg) [Urine:4550 (1.2 mL/kg/hr)]  Weight: 106.1 kg     Past Cardiology Tests (Last 3 Years):  EKG:  No results found for this or any previous visit from the past 1095 days.    Echo:  No results found for this or any previous visit from the past 1095 days.    Ejection Fractions:  No results found for: \"EF\"  Cath:  No results found for this or any previous visit from the past 1095 days.    Stress Test:  No results found for this or any previous visit from the past 1095 days.    Cardiac Imaging:  No results found for this or any previous visit from the past 1095 days.      Past Medical History:  He has no past medical history on file.    Past Surgical History:  He has no past surgical history on file.      Social History:  He has no history on file for tobacco use, alcohol use, and drug use.    Family History:  No family history on file.     Allergies:  Lisinopril    Inpatient Medications:  Scheduled medications   Medication Dose Route Frequency    amLODIPine  5 mg oral Daily    ampicillin-sulbactam  3 g intravenous q6h    atropine        enoxaparin  40 mg subcutaneous Daily     PRN medications   Medication    acetaminophen    albuterol    atropine    ketorolac     Continuous Medications   Medication Dose Last Rate     Outpatient Medications:  Current Outpatient Medications   Medication Instructions    albuterol 90 mcg/actuation inhaler INHALE 2- 4 PUFFS INTO THE LUNGS EVERY 4 HOURS AS NEEDED for 16    " diphenhydrAMINE (BENADRYL) 50 mg, oral, Nightly PRN    famotidine (PEPCID) 40 mg, oral, 2 times daily    predniSONE (DELTASONE) 60 mg, oral, Daily       Physical Exam:    Physical Exam  Vitals reviewed.   Constitutional:       General: He is not in acute distress.     Appearance: Normal appearance. He is normal weight.   HENT:      Head: Normocephalic and atraumatic.      Nose: Nose normal. No congestion or rhinorrhea.      Mouth/Throat:      Mouth: Mucous membranes are moist.      Pharynx: Oropharynx is clear. No oropharyngeal exudate or posterior oropharyngeal erythema.   Eyes:      Extraocular Movements: Extraocular movements intact.      Conjunctiva/sclera: Conjunctivae normal.      Pupils: Pupils are equal, round, and reactive to light.   Neck:      Vascular: No carotid bruit.      Comments: Incision well approximated, no discharge. Minimal erythema.  Cardiovascular:      Rate and Rhythm: Regular rhythm. Bradycardia present.      Pulses: Normal pulses.      Heart sounds: Normal heart sounds. No murmur heard.     No friction rub. No gallop.   Pulmonary:      Effort: Pulmonary effort is normal. No respiratory distress.      Breath sounds: Normal breath sounds. No wheezing or rales.   Abdominal:      General: Abdomen is flat. Bowel sounds are normal. There is no distension.      Palpations: Abdomen is soft.      Tenderness: There is no abdominal tenderness.   Musculoskeletal:         General: No swelling. Normal range of motion.      Cervical back: Normal range of motion.   Lymphadenopathy:      Cervical: No cervical adenopathy.   Skin:     General: Skin is warm and dry.      Capillary Refill: Capillary refill takes less than 2 seconds.      Findings: No erythema, lesion or rash.   Neurological:      General: No focal deficit present.      Mental Status: He is alert and oriented to person, place, and time. Mental status is at baseline.   Psychiatric:         Mood and Affect: Mood normal.         Behavior: Behavior  normal.         Thought Content: Thought content normal.         Judgment: Judgment normal.            Assessment/Plan     Javed Gil is a 42 y.o. male with HTN presenting with angioedema found to have sinus bradycardia and ?junctional rhythm.     #sinus bradycardia  Patient with angioedema s/p cryc, since decannulated, found to have sinus bradycardia. No junctional rhythm found on telemetry or ekgs. He is sinus bradycardia to 40s with elevations to 80s with exercise at bedside. This bradycardia is in setting of hypertension while off ACE and uptitrating antiHTN agents. Of note, he was given icatibant, a bradykinin b2 antagonist used in cases of hereditary angioedema. Per review, bradycardia specifically 2/2 to this agent has not been observed and is not an explicit adverse reaction.     -chronotropic competence: yes - spontaneous sinus rate increases to 80bpm on telemetry  -reasonable to dc with outpatient telemetry x14d to assess for pauses, but no sign of this here  -hold avn blocking agents with initiation of antiHTN regimen if possible   -routine care: CTM on telemetry while inpatient, maintain K >4, Mg >2      Peripheral IV 11/08/23 18 G Distal;Left;Upper;Anterior Arm (Active)   Site Assessment Clean;Dry;Intact 11/11/23 0800   Dressing Type Transparent 11/11/23 0800   Dressing Status Clean;Dry;Occlusive 11/11/23 0800   Number of days: 3       Peripheral IV 11/08/23 20 G Left;Posterior Hand (Active)   Site Assessment Clean;Dry;Intact 11/11/23 0800   Dressing Type Transparent 11/11/23 0800   Dressing Status Clean;Dry;Occlusive 11/11/23 0800   Number of days: 3       Peripheral IV 11/09/23 20 G Right Antecubital (Active)   Site Assessment Clean;Dry;Intact 11/11/23 0800   Dressing Type Transparent 11/11/23 0800   Dressing Status Clean;Dry;Occlusive 11/11/23 0800   Number of days: 2       Code Status:  Prior    I spent 40 minutes in the professional and overall care of this patient.        Eric TA  MD Risa

## 2023-11-12 VITALS
BODY MASS INDEX: 31.68 KG/M2 | TEMPERATURE: 95.9 F | RESPIRATION RATE: 21 BRPM | SYSTOLIC BLOOD PRESSURE: 168 MMHG | HEART RATE: 87 BPM | WEIGHT: 233.91 LBS | DIASTOLIC BLOOD PRESSURE: 111 MMHG | OXYGEN SATURATION: 95 % | HEIGHT: 72 IN

## 2023-11-12 LAB
BACTERIA SPEC RESP CULT: NORMAL
BACTERIA SPEC RESP CULT: NORMAL
GRAM STN SPEC: NORMAL
HOLD SPECIMEN: NORMAL

## 2023-11-12 PROCEDURE — 2500000001 HC RX 250 WO HCPCS SELF ADMINISTERED DRUGS (ALT 637 FOR MEDICARE OP)

## 2023-11-12 PROCEDURE — 96372 THER/PROPH/DIAG INJ SC/IM: CPT

## 2023-11-12 PROCEDURE — 2500000004 HC RX 250 GENERAL PHARMACY W/ HCPCS (ALT 636 FOR OP/ED)

## 2023-11-12 PROCEDURE — 93010 ELECTROCARDIOGRAM REPORT: CPT | Performed by: INTERNAL MEDICINE

## 2023-11-12 PROCEDURE — 99239 HOSP IP/OBS DSCHRG MGMT >30: CPT | Performed by: INTERNAL MEDICINE

## 2023-11-12 RX ORDER — AMLODIPINE BESYLATE 10 MG/1
10 TABLET ORAL DAILY
Status: DISCONTINUED | OUTPATIENT
Start: 2023-11-13 | End: 2023-11-12 | Stop reason: HOSPADM

## 2023-11-12 RX ORDER — AMLODIPINE BESYLATE 5 MG/1
5 TABLET ORAL ONCE
Status: COMPLETED | OUTPATIENT
Start: 2023-11-12 | End: 2023-11-12

## 2023-11-12 RX ORDER — HYDRALAZINE HYDROCHLORIDE 25 MG/1
25 TABLET, FILM COATED ORAL 3 TIMES DAILY
Qty: 90 TABLET | Refills: 0 | Status: SHIPPED | OUTPATIENT
Start: 2023-11-12 | End: 2023-12-12

## 2023-11-12 RX ORDER — HYDRALAZINE HYDROCHLORIDE 25 MG/1
25 TABLET, FILM COATED ORAL 3 TIMES DAILY
Status: DISCONTINUED | OUTPATIENT
Start: 2023-11-12 | End: 2023-11-12 | Stop reason: HOSPADM

## 2023-11-12 RX ORDER — AMLODIPINE BESYLATE 10 MG/1
10 TABLET ORAL DAILY
Qty: 30 TABLET | Refills: 0 | Status: SHIPPED | OUTPATIENT
Start: 2023-11-13 | End: 2023-12-13

## 2023-11-12 RX ORDER — HYDRALAZINE HYDROCHLORIDE 25 MG/1
25 TABLET, FILM COATED ORAL 3 TIMES DAILY
Status: DISCONTINUED | OUTPATIENT
Start: 2023-11-12 | End: 2023-11-12

## 2023-11-12 RX ADMIN — AMLODIPINE BESYLATE 5 MG: 5 TABLET ORAL at 10:03

## 2023-11-12 RX ADMIN — AMLODIPINE BESYLATE 5 MG: 5 TABLET ORAL at 09:00

## 2023-11-12 RX ADMIN — HYDRALAZINE HYDROCHLORIDE 25 MG: 25 TABLET, FILM COATED ORAL at 00:15

## 2023-11-12 RX ADMIN — KETOROLAC TROMETHAMINE 15 MG: 30 INJECTION, SOLUTION INTRAMUSCULAR; INTRAVENOUS at 00:16

## 2023-11-12 RX ADMIN — ACETAMINOPHEN 650 MG: 325 TABLET ORAL at 00:03

## 2023-11-12 RX ADMIN — ENOXAPARIN SODIUM 40 MG: 100 INJECTION SUBCUTANEOUS at 10:04

## 2023-11-12 RX ADMIN — HYDRALAZINE HYDROCHLORIDE 25 MG: 25 TABLET, FILM COATED ORAL at 11:53

## 2023-11-12 ASSESSMENT — PAIN - FUNCTIONAL ASSESSMENT
PAIN_FUNCTIONAL_ASSESSMENT: 0-10
PAIN_FUNCTIONAL_ASSESSMENT: 0-10

## 2023-11-12 ASSESSMENT — PAIN SCALES - GENERAL
PAINLEVEL_OUTOF10: 4
PAINLEVEL_OUTOF10: 2
PAINLEVEL_OUTOF10: 7

## 2023-11-12 ASSESSMENT — PAIN DESCRIPTION - LOCATION: LOCATION: BACK

## 2023-11-12 NOTE — DISCHARGE SUMMARY
Discharge Diagnosis  Angioedema secondary to Lisinopril  Hypoxic respiratory failure due to upper airway obstruction  Hypertension  Sinus bradycardia (chronic)    Issues Requiring Follow-Up  PCP follow-up for post-hospitalization and hypertension management  No need for suture removal due to use of absorbable sutures    Test Results Pending At Discharge  Pending Labs       Order Current Status    Cytology Consultation (Non-Gynecologic) Collected (11/10/23 7720)    Adenovirus PCR Quantitative (Non-Blood Specimen) In process    Aspergillus Galactomannan EIA (Non-Blood Specimen) In process    Atypical Pneumonia Panel In process    Bronchoalveolar Lavage Viral Panel PCR In process    CMV PCR, (Non-Blood Specimen) In process    Fungal Culture/Smear In process    HHV-6 PCR Quantitative (Non-Blood Specimen) In process    Legionella PCR Panel In process    Pathologist Review-Cell Count,Fluid In process    Pneumocystis jiroveci PCR Quantitative (Non-Blood Specimen) In process    Respiratory Viral Panel In process    AFB Culture/Smear Preliminary result            Hospital Course  Javed Gil is a 42 y.o.  male with HTN, started on lisinopril one year ago, presenting with  presumed ACE-I induced Angioedema. Yesterday he was trying to fall asleep and awoke with significant difficulty with breathing, shortness of breath and wheezing.       OSH course  While in the ED the patient became increasingly dyspneic, bradycardic and unresponsive with inability to protect his airway.  At that time it was elected by the ED physician to attempt to intubate the patient but upon first attempt the tube was coughed out by the patient and then with increasing edema noted on visualization with glidoscope a crack of cricothyrotomy was performed at the bedside by the ED physician successfully.Patient also received icatibant/Solu-Medrol with regards to his angioedema.  Chest x-ray noted bilateral airspace opacities concerning  for multifocal pneumonia and emphysematous changes within the soft tissues and visualized neck with linear lucencies extending into the mediastinum suggestive of pneumomediastinum given the recent cricoid.  Despite being on propofol and fentanyl,  patient was restless. While in ICU patient had violent cough leading to displacement of tube  and causing respiratory distress and subcutaneous emphysema. Rapid response was called and critical thyrotomy was extended and placed on new ET tube.  Patient condition as well as saturation improved with new tube placement.  It was then elected to start the patient on a Versed drip and continue him on a fentanyl drip with the goal of removing the propofol drip.  Patient transferred to [facility] for ENT service.      MICU     Was transferred to  MICU, then taken to OR with ENT. ENT noted that angioedema was not severe enough to merit tracheostomy. Patient was orally intubated, cricothyroidotomy site sutured. F/up cxr suggestive of pneumothorax and pneumomedistinum. Fio2 increased to 100%.    Floor:  Was transferred to [facility] team on 11/11.  Patient was seen by EP who recommended outpatient telemetry monitoring for 14 days to assess for pauses.  Patient will receive Zio patch with instructions by mail.  On the floor patient was hypertensive to systolic BPs high 180s, Amlodipine increased to 10mg daily and patient started on Hydralazine 25mg PO TID.  Patient was breathing well post extubation without shortness of breath.  He was medically stable on the floor on 11/12 and discharged home with plan to follow-up with PCP for further management of blood pressure control.       Pertinent Physical Exam At Time of Discharge  Physical Exam  HENT:      Head: Normocephalic and atraumatic.   Eyes:      General: No scleral icterus.     Extraocular Movements: Extraocular movements intact.      Pupils: Pupils are equal, round, and reactive to light.   Neck:      Comments: Midline incision with  sutures in healing appropriately  Cardiovascular:      Rate and Rhythm: Normal rate and regular rhythm.      Heart sounds: No murmur heard.     No friction rub. No gallop.   Pulmonary:      Breath sounds: Wheezing and rhonchi present. No rales.   Abdominal:      General: Bowel sounds are normal. There is no distension.      Palpations: Abdomen is soft.      Tenderness: There is no abdominal tenderness. There is no guarding.   Neurological:      General: No focal deficit present.      Mental Status: He is oriented to person, place, and time.   Psychiatric:         Mood and Affect: Mood normal.         Behavior: Behavior normal.         Home Medications     Medication List      START taking these medications     amLODIPine 10 mg tablet; Commonly known as: Norvasc; Take 1 tablet (10   mg) by mouth once daily. Do not start before November 13, 2023.; Start   taking on: November 13, 2023   hydrALAZINE 25 mg tablet; Commonly known as: Apresoline; Take 1 tablet   (25 mg) by mouth 3 times a day.   neomycin-bacitracnZn-polymyxnB ointment; Commonly known as: Neosporin   Original; Apply 1 Application topically 3 times a day.     CONTINUE taking these medications     albuterol 90 mcg/actuation inhaler   diphenhydrAMINE 25 mg capsule; Commonly known as: BENADryl; Take 2   capsules (50 mg) by mouth as needed at bedtime for allergies for up to 4   days.   famotidine 20 mg tablet; Commonly known as: Pepcid; Take 2 tablets (40   mg) by mouth 2 times a day for 15 days.     STOP taking these medications     predniSONE 20 mg tablet; Commonly known as: Deltasone       Outpatient Follow-Up  No future appointments.    Fritz Fisher MD    ATTENDING:  Assumed care of patient today  MICU course reviewed    He feels well  No complaints; anxious to be discharged  BP elevated 180/110  Denies any chest pain, headache or vision disturbances  No shortness of breath  No trouble urinating    PE -   Lying in bed in no distress  Right eye with  subconjunctival hemorrhage  No stridor  Oral - no tongue, lips or soft palate swelling  Neck - cricothyrotomy incision healing well  Lungs - clear  CV - S1, S2 - nl; no murmurs  Abd - non-tender , non-distended; soft  Ext - no edema    Medical Decision Making:  Angioedema from Lisinopril - completely resolved  HTN - BP elevated since he was extubated. Needs better control. Due to his severe bradycardia at baseline (Hrs drop into the 30s when he sleeps) will start Hydralazine 25mg TID in addition to doubling his Amlodipine to 10mg daily  If BP comes down to systolic of 160s and diastolic 90 - 100 can be discharged later today with outpatient follow up with his PCP.    I spent 35 minutes in the professional and overall care of this patient.

## 2023-11-12 NOTE — PROGRESS NOTES
Subjective Data:  None     Overnight Events:    None      Objective Data:  Last Recorded Vitals:  Vitals:    11/12/23 0731 11/12/23 1105 11/12/23 1117 11/12/23 1327   BP: (!) 162/99 (!) 173/111 (!) 153/106 (!) 168/102   BP Location:  Left arm Right arm    Patient Position:  Lying Lying    Pulse: 68 69 75 84   Resp: 19 20 20 17   Temp: 36 °C (96.8 °F) 36.7 °C (98.1 °F) 36.7 °C (98.1 °F)    TempSrc: Temporal Temporal Temporal    SpO2: 95% 96% 95% 95%   Weight:       Height:           Last Labs:  CBC - 11/11/2023:  3:13 AM  20.0 15.8 250    43.3      CMP - 11/11/2023:  3:13 AM  8.8 6.6 42 --- 0.6   3.1 4.0 20 37      PTT - 11/11/2023:  3:13 AM  1.1   12.1 27     TROPHS   Date/Time Value Ref Range Status   11/11/2023 03:13 AM 16 0 - 53 ng/L Final     HGBA1C   Date/Time Value Ref Range Status   11/08/2023 10:46 AM 5.4 see below % Final     Comment:     Hemoglobin variant detected which does not interfere with determination of Hemoglobin A1c. Hemoglobin identification can be ordered to characterize the variant if clinically indicated.     LDLCALC   Date/Time Value Ref Range Status   11/08/2023 12:58  <=99 mg/dL Final     Comment:                                 Near   Borderline      AGE      Desirable  Optimal    High     High     Very High     0-19 Y     0 - 109     ---    110-129   >/= 130     ----    20-24 Y     0 - 119     ---    120-159   >/= 160     ----      >24 Y     0 -  99   100-129  130-159   160-189     >/=190       VLDL   Date/Time Value Ref Range Status   11/08/2023 12:58 PM 10 0 - 40 mg/dL Final      Last I/O:  I/O last 3 completed shifts:  In: 300 (2.8 mL/kg) [IV Piggyback:300]  Out: 1825 (17.2 mL/kg) [Urine:1825 (0.5 mL/kg/hr)]  Weight: 106.1 kg     Past Cardiology Tests (Last 3 Years):  EKG:  No results found for this or any previous visit from the past 1095 days.    Echo:  No results found for this or any previous visit from the past 1095 days.    Ejection Fractions:  No results found for:  "\"EF\"  Cath:  No results found for this or any previous visit from the past 1095 days.    Stress Test:  No results found for this or any previous visit from the past 1095 days.    Cardiac Imaging:  No results found for this or any previous visit from the past 1095 days.      Inpatient Medications:  Scheduled medications   Medication Dose Route Frequency    [START ON 11/13/2023] amLODIPine  10 mg oral Daily    enoxaparin  40 mg subcutaneous Daily    hydrALAZINE  25 mg oral TID    neomycin-bacitracnZn-polymyxnB  1 Application Topical TID     PRN medications   Medication    acetaminophen    albuterol     Continuous Medications   Medication Dose Last Rate       Physical Exam:  Constitutional:       General: He is not in acute distress.     Appearance: Normal appearance. He is normal weight.   HENT:      Head: Normocephalic and atraumatic.      Nose: Nose normal. No congestion or rhinorrhea.      Mouth/Throat:      Mouth: Mucous membranes are moist.      Pharynx: Oropharynx is clear. No oropharyngeal exudate or posterior oropharyngeal erythema.   Eyes:      Extraocular Movements: Extraocular movements intact.      Conjunctiva/sclera: Conjunctivae normal.      Pupils: Pupils are equal, round, and reactive to light.   Neck:      Vascular: No carotid bruit.      Comments: Incision well approximated, no discharge. Minimal erythema.  Cardiovascular:      Rate and Rhythm: Regular rhythm. Bradycardia present.      Pulses: Normal pulses.      Heart sounds: Normal heart sounds. No murmur heard.     No friction rub. No gallop.   Pulmonary:      Effort: Pulmonary effort is normal. No respiratory distress.      Breath sounds: Normal breath sounds. No wheezing or rales.   Abdominal:      General: Abdomen is flat. Bowel sounds are normal. There is no distension.      Palpations: Abdomen is soft.      Tenderness: There is no abdominal tenderness.   Musculoskeletal:         General: No swelling. Normal range of motion.      Cervical " back: Normal range of motion.   Lymphadenopathy:      Cervical: No cervical adenopathy.   Skin:     General: Skin is warm and dry.      Capillary Refill: Capillary refill takes less than 2 seconds.      Findings: No erythema, lesion or rash.   Neurological:      General: No focal deficit present.      Mental Status: He is alert and oriented to person, place, and time. Mental status is at baseline.   Psychiatric:         Mood and Affect: Mood normal.         Behavior: Behavior normal.         Thought Content: Thought content normal.         Judgment: Judgment normal.            Assessment/Plan   Javed Gil is a 42 y.o. male with HTN presenting with angioedema found to have sinus bradycardia and ?junctional rhythm.      #sinus bradycardia  Patient with angioedema s/p cryc, since decannulated, found to have sinus bradycardia. No junctional rhythm found on telemetry or ekgs. He is sinus bradycardia to 40s with elevations to 80s with exercise at bedside. This bradycardia is in setting of hypertension while off ACE and uptitrating antiHTN agents. Of note, he was given icatibant, a bradykinin b2 antagonist used in cases of hereditary angioedema. Per review, bradycardia specifically 2/2 to this agent has not been observed and is not an explicit adverse reaction.      -chronotropic competence: yes - spontaneous sinus rate increases to 80bpm on telemetry  -reasonable to dc with outpatient telemetry x14d to assess for pauses, but no sign of this here  -hold avn blocking agents with initiation of antiHTN regimen if possible   -routine care: CTM on telemetry while inpatient, maintain K >4, Mg >2   No indication  for PPM , I revied his tele and ECG today, which showed sinus bradycardia patient denies any sx.         EP Will sign off     Peripheral IV 11/08/23 18 G Distal;Left;Upper;Anterior Arm (Active)   Site Assessment Clean;Dry;Intact 11/11/23 2000   Dressing Status Clean;Dry 11/11/23 2000   Number of days: 4        Peripheral IV 11/08/23 20 G Left;Posterior Hand (Active)   Site Assessment Clean;Dry;Intact 11/11/23 2000   Dressing Status Clean;Dry 11/11/23 2000   Number of days: 4       Peripheral IV 11/09/23 20 G Right Antecubital (Active)   Site Assessment Clean;Dry;Intact 11/11/23 2000   Dressing Status Clean;Dry;Occlusive 11/11/23 2000   Number of days: 3       Code Status:  Prior    I spent 30 minutes in the professional and overall care of this patient.        Lucero Zimmerman MD

## 2023-11-12 NOTE — CARE PLAN
The patient's goals for the shift include      The clinical goals for the shift include Will have no urinary retention during this shift.    Problem: Pain - Adult  Goal: Verbalizes/displays adequate comfort level or baseline comfort level  Outcome: Progressing  Flowsheets (Taken 11/12/2023 0153)  Verbalizes/displays adequate comfort level or baseline comfort level:   Encourage patient to monitor pain and request assistance   Assess pain using appropriate pain scale   Administer analgesics based on type and severity of pain and evaluate response     Problem: Safety - Adult  Goal: Free from fall injury  Outcome: Progressing  Flowsheets (Taken 11/12/2023 0153)  Free from fall injury: Based on caregiver fall risk screen, instruct family/caregiver to ask for assistance with transferring infant if caregiver noted to have fall risk factors     Problem: Skin  Goal: Decreased wound size/increased tissue granulation at next dressing change  Outcome: Progressing  Flowsheets (Taken 11/12/2023 0153)  Decreased wound size/increased tissue granulation at next dressing change:   Promote sleep for wound healing   Utilize specialty bed per algorithm  Goal: Participates in plan/prevention/treatment measures  Outcome: Progressing  Flowsheets (Taken 11/12/2023 0153)  Participates in plan/prevention/treatment measures:   Discuss with provider PT/OT consult   Elevate heels   Increase activity/out of bed for meals  Goal: Promote/optimize nutrition  Outcome: Progressing  Flowsheets (Taken 11/12/2023 0153)  Promote/optimize nutrition:   Monitor/record intake including meals   Offer water/supplements/favorite foods   Consume > 50% meals/supplements   Reassess MST if dietician not consulted     Problem: Pain  Goal: Takes deep breaths with improved pain control throughout the shift  Outcome: Progressing  Goal: Turns in bed with improved pain control throughout the shift  Outcome: Progressing  Goal: Walks with improved pain control throughout  the shift  Outcome: Progressing  Goal: Performs ADL's with improved pain control throughout shift  Outcome: Progressing  Goal: Participates in PT with improved pain control throughout the shift  Outcome: Progressing  Goal: Free from opioid side effects throughout the shift  Outcome: Progressing  Goal: Free from acute confusion related to pain meds throughout the shift  Outcome: Progressing

## 2023-11-12 NOTE — DISCHARGE INSTRUCTIONS
Dear Mr Gil,    You presented to the [facility] ED on 11/8 for difficulty breathing and swelling of the airway called angioedema.  While in the ED it was determined based on the significance of your disease that you were unable to protect your airway.  It was decided by the ED physician to attempt to intubate you, that is to pass a tube down your throat to assist your breathing.  The ED team was unable to place a breathing tube due to significant tissue swelling, so a procedure called a cricoidectomy was performed where an incision was made into your neck to assist your breathing.  You were transferred to the ICU, and underwent a cricothyrotomy due to the breathing tube being displaced by coughing.  You were then transferred to [facility] for evaluation and treatment by our ENT service.  You were taken to surgery by the ENT service who determined the degree of swelling in your throat was not severe enough to require a tracheostomy.  The ENT team placed an oral intubation tube, and the cricothyroidotomy incision in your neck was sutured closed.  As you clinically improved in the ICU after surgery, you were transferred to the medicine service on 11/11.  While on our service you were seen by a specialist Cardiologist team called Electrophysiology due to your persistently low heart rates.  The cardiology team felt your heart rate did not warrant further inpatient treatment, and recommended that on discharge you wear a portal heart monitor for 2 weeks to evaluate for any abnormalities of heart rhythym.  You will receive a heart monitor in the mail with instructions on how to use it, and after wearing it for 2 weeks you will mail it back to the Cardiology service.  You will then follow-up with the outpatient Cardiologist to discuss the results of the monitor.  On the medical floor your blood pressure measurements were elevated, likely in part due to stopping your Lisinopril.  Your home blood pressure medication  Amlodipine was increased in dose from 5 to 10mg daily, and you were started on a new blood pressure medicine called Hydralazine 25mg three times daily.    This very severe medical situation you went through was the result of a blood pressure medication you used to take called Lisinopril.  Lisinopril can rarely cause severe swelling like you had called Angioedema, even though you had been on the medicine safely for years prior to developing a problem.  After the offending medication was removed from your system and you received proper treatment you began to quickly recover.  You were medically stable on the floor on 11/12 and ready for discharge home.  It is important that once you leave the hospital you adhere to your new blood pressure regimen until you are seen by your primary care provider, who will make changes as he or she feels is appropriate.  It is also absolutely critical that you DO NOT TAKE any further doses of Lisinopril.    It was a pleasure taking care of you here at [facility], we wish you a speedy recovery.    Sincerely,  Your Internal Medicine Team    MEDICATION CHANGES:  STOP taking Lisinopril  START taking Amlodipine 10mg daily  START taking Hydralazine 25mg three times daily  START applying neosporin/bacitracin ointment to incision site for next 3 days    FOLLOW-UP APPOINTMENTS:  - Follow-up with Primary Care Provider for post-hospitalization appointment and for management of blood pressure  - Follow-up with Cardiology after receiving the cardiac monitor by mail and completing 2 week course of cardiac monitoring

## 2023-11-13 LAB
LABORATORY COMMENT REPORT: NORMAL
LABORATORY COMMENT REPORT: NORMAL
PATH REPORT.FINAL DX SPEC: NORMAL
PATH REPORT.GROSS SPEC: NORMAL
PATH REPORT.RELEVANT HX SPEC: NORMAL
PATH REPORT.TOTAL CANCER: NORMAL
PATH REVIEW-CELL CT,FLUID: NORMAL

## 2023-11-14 ENCOUNTER — HOSPITAL ENCOUNTER (OUTPATIENT)
Dept: CARDIOLOGY | Facility: HOSPITAL | Age: 43
Discharge: HOME | End: 2023-11-14
Payer: COMMERCIAL

## 2023-11-14 LAB
ATRIAL RATE: 46 BPM
P AXIS: 7 DEGREES
P OFFSET: 206 MS
P ONSET: 157 MS
PR INTERVAL: 114 MS
Q ONSET: 214 MS
QRS COUNT: 8 BEATS
QRS DURATION: 110 MS
QT INTERVAL: 492 MS
QTC CALCULATION(BAZETT): 430 MS
QTC FREDERICIA: 450 MS
R AXIS: -14 DEGREES
T AXIS: -14 DEGREES
T OFFSET: 460 MS
VENTRICULAR RATE: 46 BPM

## 2023-11-14 PROCEDURE — 93005 ELECTROCARDIOGRAM TRACING: CPT

## 2023-11-17 LAB
ATRIAL RATE: 56 BPM
ATRIAL RATE: 85 BPM
P AXIS: 20 DEGREES
P OFFSET: 204 MS
P ONSET: 149 MS
PR INTERVAL: 138 MS
Q ONSET: 207 MS
Q ONSET: 218 MS
QRS COUNT: 15 BEATS
QRS COUNT: 9 BEATS
QRS DURATION: 96 MS
QRS DURATION: 98 MS
QT INTERVAL: 372 MS
QT INTERVAL: 494 MS
QTC CALCULATION(BAZETT): 457 MS
QTC CALCULATION(BAZETT): 476 MS
QTC FREDERICIA: 427 MS
QTC FREDERICIA: 483 MS
R AXIS: 11 DEGREES
R AXIS: 24 DEGREES
T AXIS: 40 DEGREES
T AXIS: 53 DEGREES
T OFFSET: 393 MS
T OFFSET: 465 MS
VENTRICULAR RATE: 56 BPM
VENTRICULAR RATE: 91 BPM

## 2023-11-27 ENCOUNTER — HOSPITAL ENCOUNTER (EMERGENCY)
Facility: HOSPITAL | Age: 43
Discharge: HOME | End: 2023-11-27
Attending: EMERGENCY MEDICINE
Payer: COMMERCIAL

## 2023-11-27 VITALS
HEART RATE: 85 BPM | DIASTOLIC BLOOD PRESSURE: 124 MMHG | SYSTOLIC BLOOD PRESSURE: 168 MMHG | RESPIRATION RATE: 20 BRPM | TEMPERATURE: 98.6 F | BODY MASS INDEX: 29.8 KG/M2 | WEIGHT: 220 LBS | OXYGEN SATURATION: 96 % | HEIGHT: 72 IN

## 2023-11-27 DIAGNOSIS — K59.00 CONSTIPATION, UNSPECIFIED CONSTIPATION TYPE: Primary | ICD-10-CM

## 2023-11-27 LAB
FUNGUS SPEC CULT: NORMAL
FUNGUS SPEC FUNGUS STN: NORMAL

## 2023-11-27 PROCEDURE — 99283 EMERGENCY DEPT VISIT LOW MDM: CPT | Performed by: EMERGENCY MEDICINE

## 2023-11-27 PROCEDURE — 99282 EMERGENCY DEPT VISIT SF MDM: CPT

## 2023-11-27 RX ORDER — POLYETHYLENE GLYCOL 3350 17 G/17G
17 POWDER, FOR SOLUTION ORAL DAILY
Qty: 7 PACKET | Refills: 0 | Status: SHIPPED | OUTPATIENT
Start: 2023-11-27 | End: 2023-12-04

## 2023-11-27 ASSESSMENT — PAIN - FUNCTIONAL ASSESSMENT: PAIN_FUNCTIONAL_ASSESSMENT: 0-10

## 2023-11-27 ASSESSMENT — PAIN DESCRIPTION - LOCATION: LOCATION: RECTUM

## 2023-11-27 ASSESSMENT — PAIN SCALES - GENERAL: PAINLEVEL_OUTOF10: 10 - WORST POSSIBLE PAIN

## 2023-11-28 NOTE — DISCHARGE INSTRUCTIONS
Please return to the emergency department if you have uncontrolled pain.  Please follow-up with your primary care provider

## 2023-11-28 NOTE — PROGRESS NOTES
This patient was seen by the offgoing provider.  Please see their note for full history and physical exam.    Briefly, this is a 42 y.o. male presenting to the ED with constipation.  At the time of signout, patient is pending enema.      On my evaluation, patient had just received his enema and had subsequent stool output.  Patient noted significant improvement in his abdominal discomfort.  Patient will be given a prescription for MiraLAX.  He remained hemodynamically stable and well-appearing.  Patient is requesting discharge home and was discharged home with return precautions discussed, and instructions to follow-up with his primary care provider.        Jaci Deshpande MD  Emergency Medicine

## 2023-11-28 NOTE — ED PROVIDER NOTES
HPI   Chief Complaint   Patient presents with   • Rectal Pain     Since today.  States has stool stuck but is too painful to pass       Patient has rectal pain and inability to defecate.  He states that he tried to defecate yesterday but he was unable to.  He has a lot of pressure in his rectal region and states that he cannot get it out.  He tried laxatives, stool softeners and drinking whole milk and it has not come out.  He denies any anterior abdominal pain.  No nausea or vomiting.  No history of bowel obstructions in the past.  He states he feels like he has to defecate and is right on the edge but he cannot complete this.                          No data recorded                Patient History   No past medical history on file.  No past surgical history on file.  No family history on file.  Social History     Tobacco Use   • Smoking status: Not on file   • Smokeless tobacco: Not on file   Substance Use Topics   • Alcohol use: Not on file   • Drug use: Not on file       Physical Exam   ED Triage Vitals [11/27/23 1848]   Temp Heart Rate Resp BP   37 °C (98.6 °F) 85 20 (!) 168/124      SpO2 Temp src Heart Rate Source Patient Position   96 % -- Monitor Sitting      BP Location FiO2 (%)     Left arm --       Physical Exam  Vitals and nursing note reviewed.   Constitutional:       Appearance: Normal appearance.   HENT:      Head: Normocephalic and atraumatic.      Mouth/Throat:      Mouth: Mucous membranes are moist.   Eyes:      Extraocular Movements: Extraocular movements intact.      Pupils: Pupils are equal, round, and reactive to light.   Cardiovascular:      Rate and Rhythm: Normal rate and regular rhythm.      Heart sounds: No murmur heard.  Pulmonary:      Effort: Pulmonary effort is normal. No respiratory distress.      Breath sounds: Normal breath sounds.   Abdominal:      General: There is no distension.      Palpations: Abdomen is soft.      Tenderness: There is no abdominal tenderness.    Genitourinary:     Comments: Deferred  Musculoskeletal:         General: No deformity. Normal range of motion.   Skin:     General: Skin is warm and dry.      Findings: No lesion or rash.   Neurological:      General: No focal deficit present.      Mental Status: He is alert and oriented to person, place, and time.      Sensory: No sensory deficit.      Motor: No weakness.   Psychiatric:         Behavior: Behavior normal.       Labs Reviewed - No data to display  No orders to display     ED Course & MDM   Diagnoses as of 11/27/23 2023   Constipation, unspecified constipation type       Medical Decision Making  Differentials include fecal impaction, constipation.  Imaging studies, if performed, were independently reviewed and interpreted by myself and confirmed by radiologist. EKG(s), if performed, were interpreted by myself. Patient complains of pressure in his rectal area.  I have ordered him a soapsuds enema to see if this will help facilitate a bowel movement.  Patient will be reevaluated after his enema.        Procedure  Procedures     Magdiel Lundberg MD  11/27/23 2023

## 2023-11-30 ENCOUNTER — HOSPITAL ENCOUNTER (OUTPATIENT)
Dept: CARDIOLOGY | Facility: HOSPITAL | Age: 43
Discharge: HOME | End: 2023-11-30
Payer: COMMERCIAL

## 2023-11-30 DIAGNOSIS — J96.01 ACUTE RESPIRATORY FAILURE WITH HYPOXIA AND HYPERCAPNIA (MULTI): ICD-10-CM

## 2023-11-30 DIAGNOSIS — J96.02 ACUTE RESPIRATORY FAILURE WITH HYPOXIA AND HYPERCAPNIA (MULTI): ICD-10-CM

## 2023-11-30 PROCEDURE — 93246 EXT ECG>7D<15D RECORDING: CPT

## 2023-12-06 ENCOUNTER — OFFICE VISIT (OUTPATIENT)
Dept: OTOLARYNGOLOGY | Facility: HOSPITAL | Age: 43
End: 2023-12-06
Payer: COMMERCIAL

## 2023-12-06 VITALS — WEIGHT: 229.5 LBS | HEIGHT: 72 IN | BODY MASS INDEX: 31.08 KG/M2 | TEMPERATURE: 97.3 F

## 2023-12-06 DIAGNOSIS — L92.9 GRANULATION TISSUE: ICD-10-CM

## 2023-12-06 DIAGNOSIS — Z98.890 REQUIRED EMERGENT INTUBATION: ICD-10-CM

## 2023-12-06 DIAGNOSIS — T78.3XXS ANGIOEDEMA, SEQUELA: Primary | ICD-10-CM

## 2023-12-06 PROCEDURE — 99214 OFFICE O/P EST MOD 30 MIN: CPT | Performed by: OTOLARYNGOLOGY

## 2023-12-06 PROCEDURE — 17250 CHEM CAUT OF GRANLTJ TISSUE: CPT | Performed by: OTOLARYNGOLOGY

## 2023-12-06 PROCEDURE — 31575 DIAGNOSTIC LARYNGOSCOPY: CPT | Performed by: OTOLARYNGOLOGY

## 2023-12-06 RX ORDER — ERGOCALCIFEROL 1.25 MG/1
1 CAPSULE ORAL
COMMUNITY
Start: 2023-11-21 | End: 2024-02-13

## 2023-12-06 RX ORDER — HYDROCHLOROTHIAZIDE 25 MG/1
TABLET ORAL EVERY 24 HOURS
COMMUNITY

## 2023-12-06 RX ORDER — FLUTICASONE PROPIONATE 110 UG/1
1 AEROSOL, METERED RESPIRATORY (INHALATION) EVERY 12 HOURS
COMMUNITY

## 2023-12-06 ASSESSMENT — PATIENT HEALTH QUESTIONNAIRE - PHQ9
1. LITTLE INTEREST OR PLEASURE IN DOING THINGS: NOT AT ALL
2. FEELING DOWN, DEPRESSED OR HOPELESS: NOT AT ALL
SUM OF ALL RESPONSES TO PHQ9 QUESTIONS 1 & 2: 0

## 2023-12-06 NOTE — PROGRESS NOTES
Patient: Javed Gil   MRN: [id number] YOB: 1980   Sex: male Age: 42 y.o.  Date of Service: 2023       ASSESSMENT AND PLAN  I discussed the findings with Javed Gil and have recommended the followin. Angioedema secondary to lisinopril s/p cricothyrotomy, laryngoscopy today within normal limits  - precautions provided    2. Granulation tissue cauterized with silver nitrate today  - no need for antibiotics today, return precautions provided      CHIEF COMPLAINT  No chief complaint on file.      HISTORY OF PRESENT ILLNESS  Javed Gil is a 42 y.o. male here for hospital follow-up after admission for presumed ACE-I induced Angioedema s/p cricothyrotomy in the ED due to complications during intubation. Did not convert to formal tracheotomy. He notes that last week, his cric incision was draining purulent fluid. Mild pain and erythema. No fevers.    Breathing is good. Voice is improving. No dysphagia.      ADDITIONAL HISTORY  Past Medical History  He has no past medical history on file. Surgical History  He has no past surgical history on file.   Social History  He reports that he quit smoking about 8 years ago. His smoking use included cigarettes. He does not have any smokeless tobacco history on file. No history on file for alcohol use and drug use. Allergies  Lisinopril     Family History  No family history on file.     REVIEW OF SYSTEMS  All 10 systems were reviewed and negative except for above.      PHYSICAL EXAM  ENT Physical Exam   GENERAL: Well-nourished and developed, alert and appropriate, no distress, voice [id number]  RESPIRATORY: Breathing quietly, no stridor  HEAD: Normocephalic atraumatic  FACE: Symmetric, no masses or lesions  EYES:  Pupils reactive, sclera clear, external ocular muscles intact, no nystagmus.    EARS:  Pinnae normal. External auditory canals clear and tympanic membranes intact.  NOSE:  No anterior lesions, masses or polyps.  ORAL  CAVITY/OROPHARYNX:  Buccal mucosa is moist without lesions or masses, tongue midline and palate elevates symmetrically. Tongue mobility intact.  NECK:  Soft. There is no lymphadenopathy or thyromegaly.  Well healed midline neck scar, small punctate granulation tissue at midline, no significant surrounding erythema or pain to palpation. Treated with silver nitrate.  NEUROLOGIC:  Cranial nerves II-XII grossly intact.       Last Recorded Vitals  Temperature 36.3 °C (97.3 °F), height 1.829 m (6'), weight 104 kg (229 lb 8 oz).    RESULTS    Patient Reported Outcome Measures  N/A    Laboratory, Radiology, and Pathology  N/A      PROCEDURES  Laryngoscopy    Date/Time: 12/6/2023 1:30 PM    Performed by: Haley Reardon MD  Authorized by: Haley Reardon MD         Flexible Fiberoptic Laryngoscopy     Patient failed a mirror exam due to limitations of equipment and the need for laryngoscopy to assess laryngeal anatomy and function    PREOPERATIVE DIAGNOSIS: s/p cricothyrotomy, history of angioedema    POSTOPERATIVE DIAGNOSIS: Same    PROCEDURE: Transnasal videolaryngoscopy    ANESTHESIA:  Topical    COMPLICATIONS:  None    SPECIMENS:  None    PROCEDURE IN DETAIL:  The patient was brought into the endoscopy suite, placed in the upright position.  The nasal cavity was topically decongested anesthetized.  The distal chip video laryngoscope was passed through the nasal cavity.  The nasal cavity and nasopharynx were within normal limits. The following findings on laryngoscopy were noted:         Tongue Base: normal              Left vocal fold mobility: mobile though asymmetric compared to right         Right vocal fold mobility: mobile         Glottal closure: complete         Laryngeal muscle tension: mild         Symmetry: mild asymmetry in motion         Vocal fold free edge: normal         Other abnormalities: no obvious narrowing in the subglottis    The patient tolerated the procedure well.      Following the laryngocsopy the  patient's granulation tissue was cauterized using silver nitrate as indicated above.    ----------------------------------------------------------------------  Haley Reardon MD, MAEd    [facility]  Department of Otolaryngology - Head and Neck Surgery  [facility]    The total time I spent in care of this patient today (excluding time spent on other billable services) is as follows:    Time Spent  Prep time on day of patient encounter: 5 minutes  Time spent directly with patient, family or caregiver: 15 minutes  Additional Time Spent on Patient Care Activities: 5 minutes  Documentation Time: 5 minutes  Other Time Spent: 0 minutes  Total: 30 minutes

## 2024-01-03 LAB
ACID FAST STN SPEC: NORMAL
MYCOBACTERIUM SPEC CULT: NORMAL

## 2024-02-15 ENCOUNTER — APPOINTMENT (OUTPATIENT)
Dept: RADIOLOGY | Facility: HOSPITAL | Age: 44
End: 2024-02-15
Payer: COMMERCIAL

## 2024-02-15 ENCOUNTER — HOSPITAL ENCOUNTER (EMERGENCY)
Facility: HOSPITAL | Age: 44
Discharge: HOME | End: 2024-02-15
Payer: COMMERCIAL

## 2024-02-15 VITALS
DIASTOLIC BLOOD PRESSURE: 98 MMHG | WEIGHT: 215 LBS | HEART RATE: 81 BPM | TEMPERATURE: 97.2 F | BODY MASS INDEX: 29.12 KG/M2 | RESPIRATION RATE: 18 BRPM | OXYGEN SATURATION: 97 % | SYSTOLIC BLOOD PRESSURE: 159 MMHG | HEIGHT: 72 IN

## 2024-02-15 DIAGNOSIS — J45.901 MILD ASTHMA WITH EXACERBATION, UNSPECIFIED WHETHER PERSISTENT (HHS-HCC): Primary | ICD-10-CM

## 2024-02-15 PROCEDURE — 99283 EMERGENCY DEPT VISIT LOW MDM: CPT | Mod: 25 | Performed by: NURSE PRACTITIONER

## 2024-02-15 PROCEDURE — 2500000002 HC RX 250 W HCPCS SELF ADMINISTERED DRUGS (ALT 637 FOR MEDICARE OP, ALT 636 FOR OP/ED): Performed by: NURSE PRACTITIONER

## 2024-02-15 PROCEDURE — 71046 X-RAY EXAM CHEST 2 VIEWS: CPT

## 2024-02-15 PROCEDURE — 99283 EMERGENCY DEPT VISIT LOW MDM: CPT

## 2024-02-15 PROCEDURE — 71046 X-RAY EXAM CHEST 2 VIEWS: CPT | Performed by: RADIOLOGY

## 2024-02-15 PROCEDURE — 2500000004 HC RX 250 GENERAL PHARMACY W/ HCPCS (ALT 636 FOR OP/ED): Performed by: NURSE PRACTITIONER

## 2024-02-15 PROCEDURE — 94640 AIRWAY INHALATION TREATMENT: CPT | Mod: 59

## 2024-02-15 RX ORDER — PREDNISONE 20 MG/1
40 TABLET ORAL ONCE
Status: COMPLETED | OUTPATIENT
Start: 2024-02-15 | End: 2024-02-15

## 2024-02-15 RX ORDER — IPRATROPIUM BROMIDE AND ALBUTEROL SULFATE 2.5; .5 MG/3ML; MG/3ML
6 SOLUTION RESPIRATORY (INHALATION) ONCE
Status: COMPLETED | OUTPATIENT
Start: 2024-02-15 | End: 2024-02-15

## 2024-02-15 RX ORDER — PREDNISONE 20 MG/1
40 TABLET ORAL DAILY
Qty: 10 TABLET | Refills: 0 | Status: SHIPPED | OUTPATIENT
Start: 2024-02-15 | End: 2024-02-20

## 2024-02-15 RX ADMIN — IPRATROPIUM BROMIDE AND ALBUTEROL SULFATE 6 ML: 2.5; .5 SOLUTION RESPIRATORY (INHALATION) at 10:50

## 2024-02-15 RX ADMIN — PREDNISONE 40 MG: 20 TABLET ORAL at 10:50

## 2024-02-15 ASSESSMENT — COLUMBIA-SUICIDE SEVERITY RATING SCALE - C-SSRS
1. IN THE PAST MONTH, HAVE YOU WISHED YOU WERE DEAD OR WISHED YOU COULD GO TO SLEEP AND NOT WAKE UP?: NO
6. HAVE YOU EVER DONE ANYTHING, STARTED TO DO ANYTHING, OR PREPARED TO DO ANYTHING TO END YOUR LIFE?: NO
2. HAVE YOU ACTUALLY HAD ANY THOUGHTS OF KILLING YOURSELF?: NO

## 2024-02-15 ASSESSMENT — PAIN SCALES - GENERAL: PAINLEVEL_OUTOF10: 0 - NO PAIN

## 2024-02-15 ASSESSMENT — PAIN - FUNCTIONAL ASSESSMENT: PAIN_FUNCTIONAL_ASSESSMENT: 0-10

## 2024-02-15 NOTE — ED PROVIDER NOTES
HPI   Chief Complaint   Patient presents with    Shortness of Breath     Asthma exacerbation.  States inhaler minimally helping.         43-year-old male with a past history of hypertension and asthma presents to the emergency department today complaining of wheezing.  Patient states that he was prescribed a inhaled corticosteroid inhaler from his primary care provider however it was too expensive so they are actively searching for a cheaper ICS as.  Has been taking albuterol 2 puffs over the past couple of days due to wheezing and shortness of breath.  States that the albuterol inhaler is minimally helping his shortness of breath at this point.  No lower extremity swelling, dizziness, syncope, chest pain, abdominal or back pain.  Denies any urinary symptoms.                          Wisner Coma Scale Score: 15                  Patient History   No past medical history on file.  No past surgical history on file.  No family history on file.  Social History     Tobacco Use    Smoking status: Former     Types: Cigarettes     Quit date:      Years since quittin.1    Smokeless tobacco: Not on file   Substance Use Topics    Alcohol use: Not on file    Drug use: Not on file       Physical Exam   ED Triage Vitals   Temperature Heart Rate Respirations BP   02/15/24 1002 02/15/24 1002 02/15/24 1002 02/15/24 1005   36.2 °C (97.2 °F) 81 18 (!) 159/98      Pulse Ox Temp Source Heart Rate Source Patient Position   02/15/24 1002 02/15/24 1002 -- --   97 % Temporal        BP Location FiO2 (%)     -- --             Physical Exam  Vitals and nursing note reviewed.   Constitutional:       General: He is not in acute distress.     Appearance: Normal appearance. He is not toxic-appearing.   HENT:      Right Ear: Tympanic membrane normal.      Left Ear: Tympanic membrane normal.      Mouth/Throat:      Mouth: Mucous membranes are moist.      Pharynx: Oropharynx is clear.   Eyes:      Extraocular Movements: Extraocular movements  intact.      Pupils: Pupils are equal, round, and reactive to light.   Cardiovascular:      Rate and Rhythm: Normal rate and regular rhythm.      Pulses: Normal pulses.      Heart sounds: Normal heart sounds.   Pulmonary:      Effort: Pulmonary effort is normal. No tachypnea, accessory muscle usage or respiratory distress.      Breath sounds: Wheezing present.   Abdominal:      General: Abdomen is flat. Bowel sounds are normal.      Palpations: Abdomen is soft.      Tenderness: There is no abdominal tenderness.   Musculoskeletal:         General: Normal range of motion.      Cervical back: Normal range of motion and neck supple.      Right lower leg: No edema.      Left lower leg: No edema.   Skin:     General: Skin is warm and dry.      Capillary Refill: Capillary refill takes less than 2 seconds.   Neurological:      General: No focal deficit present.      Mental Status: He is alert and oriented to person, place, and time.   Psychiatric:         Mood and Affect: Mood normal.         Behavior: Behavior normal.         Judgment: Judgment normal.         Labs Reviewed - No data to display  Pain Management Panel          Latest Ref Rng & Units 11/10/2023   Pain Management Panel   Amphetamine Screen, Urine Presumptive Negative Presumptive Negative    Barbiturate Screen, Urine Presumptive Negative Presumptive Negative    Benzodiazepines Screen, Urine Presumptive Negative Presumptive Positive    Fentanyl Screen, Urine Presumptive Negative Presumptive Positive      No orders to display       ED Course & MDM   Diagnoses as of 02/15/24 1240   Mild asthma with exacerbation, unspecified whether persistent       Medical Decision Making  On initial evaluation patient is in no acute distress at this time.  He does have inspiratory and expiratory wheezing on exam.  Was given a double DuoNeb treatment and 40 mg of prednisone.  Chest x-ray was obtained which shows no evidence of acute cardiopulmonary process.  On reevaluation  breath sounds have improved significantly does have slight end expiratory wheezing noted however he states he is feeling much better at this time.  There is no hypoxia or tachycardia at rest nor on ambulation.  Patient would like to go home at this point.  I did send a prescription for prednisone to his pharmacy educated him on the use of his albuterol inhaler.  States that he does have enough doses as well as a refill.  Discussed tricked return precautions and close outpatient follow-up he verbalized understanding agreement this plan has no further questions or concerns and patient will be discharged home in improved condition.        Procedure  Procedures        I discussed the differential, results and discharge plan with the patient and/or family/friend/caregiver if present.  I emphasized the importance of follow-up with the physician I referred them to in the timeframe recommended.  I explained reasons for the patient to return to the Emergency Department. Additional verbal discharge instructions were also given and discussed with the patient to supplement those generated by the EMR. We also discussed medications that were prescribed (if any) including common side effects and interactions. The patient was advised to abstain from driving, operating heavy machinery or making significant decisions while taking medications such as opiates and muscle relaxers that may impair this. All questions were addressed.  They understand return precautions and discharge instructions. The patient and/or family/friend/caregiver expressed understanding.           Jeanine Bernardo, DONTA-ROBINSON  02/15/24 2021

## 2024-02-15 NOTE — Clinical Note
Javed Gil was seen and treated in our emergency department on 2/15/2024.  He may return to work on 02/17/2024.       If you have any questions or concerns, please don't hesitate to call.      Jeanine Bernardo, APRN-CNP

## 2025-01-30 ENCOUNTER — HOSPITAL ENCOUNTER (EMERGENCY)
Facility: HOSPITAL | Age: 45
Discharge: HOME | End: 2025-01-30
Payer: COMMERCIAL

## 2025-01-30 VITALS
WEIGHT: 220 LBS | DIASTOLIC BLOOD PRESSURE: 89 MMHG | SYSTOLIC BLOOD PRESSURE: 172 MMHG | TEMPERATURE: 98 F | HEIGHT: 72 IN | BODY MASS INDEX: 29.8 KG/M2 | RESPIRATION RATE: 16 BRPM | HEART RATE: 72 BPM

## 2025-01-30 DIAGNOSIS — S39.012A LUMBAR STRAIN, INITIAL ENCOUNTER: Primary | ICD-10-CM

## 2025-01-30 PROCEDURE — 96372 THER/PROPH/DIAG INJ SC/IM: CPT

## 2025-01-30 PROCEDURE — 2500000004 HC RX 250 GENERAL PHARMACY W/ HCPCS (ALT 636 FOR OP/ED)

## 2025-01-30 PROCEDURE — 99284 EMERGENCY DEPT VISIT MOD MDM: CPT

## 2025-01-30 PROCEDURE — 2500000005 HC RX 250 GENERAL PHARMACY W/O HCPCS

## 2025-01-30 RX ORDER — PREDNISONE 10 MG/1
60 TABLET ORAL ONCE
Status: COMPLETED | OUTPATIENT
Start: 2025-01-30 | End: 2025-01-30

## 2025-01-30 RX ORDER — LIDOCAINE 560 MG/1
1 PATCH PERCUTANEOUS; TOPICAL; TRANSDERMAL ONCE
Status: DISCONTINUED | OUTPATIENT
Start: 2025-01-30 | End: 2025-01-30 | Stop reason: HOSPADM

## 2025-01-30 RX ORDER — ORPHENADRINE CITRATE 30 MG/ML
60 INJECTION INTRAMUSCULAR; INTRAVENOUS ONCE
Status: COMPLETED | OUTPATIENT
Start: 2025-01-30 | End: 2025-01-30

## 2025-01-30 RX ORDER — IBUPROFEN 600 MG/1
600 TABLET ORAL EVERY 6 HOURS PRN
Qty: 30 TABLET | Refills: 0 | Status: SHIPPED | OUTPATIENT
Start: 2025-01-30

## 2025-01-30 RX ORDER — LIDOCAINE 50 MG/G
1 PATCH TOPICAL DAILY
Qty: 15 PATCH | Refills: 0 | Status: SHIPPED | OUTPATIENT
Start: 2025-01-30

## 2025-01-30 RX ORDER — TIZANIDINE 4 MG/1
4 TABLET ORAL EVERY 6 HOURS PRN
Qty: 30 TABLET | Refills: 0 | Status: SHIPPED | OUTPATIENT
Start: 2025-01-30 | End: 2025-02-09

## 2025-01-30 RX ORDER — KETOROLAC TROMETHAMINE 30 MG/ML
60 INJECTION, SOLUTION INTRAMUSCULAR; INTRAVENOUS ONCE
Status: COMPLETED | OUTPATIENT
Start: 2025-01-30 | End: 2025-01-30

## 2025-01-30 RX ORDER — PREDNISONE 50 MG/1
50 TABLET ORAL DAILY
Qty: 5 TABLET | Refills: 0 | Status: SHIPPED | OUTPATIENT
Start: 2025-01-30 | End: 2025-02-04

## 2025-01-30 RX ADMIN — LIDOCAINE 4% 1 PATCH: 40 PATCH TOPICAL at 09:22

## 2025-01-30 RX ADMIN — PREDNISONE 60 MG: 10 TABLET ORAL at 09:20

## 2025-01-30 RX ADMIN — ORPHENADRINE CITRATE 60 MG: 30 INJECTION, SOLUTION INTRAMUSCULAR; INTRAVENOUS at 09:22

## 2025-01-30 RX ADMIN — KETOROLAC TROMETHAMINE 60 MG: 30 INJECTION, SOLUTION INTRAMUSCULAR at 09:20

## 2025-01-30 ASSESSMENT — PAIN SCALES - GENERAL
PAINLEVEL_OUTOF10: 9
PAINLEVEL_OUTOF10: 4

## 2025-01-30 ASSESSMENT — PAIN - FUNCTIONAL ASSESSMENT
PAIN_FUNCTIONAL_ASSESSMENT: 0-10
PAIN_FUNCTIONAL_ASSESSMENT: 0-10

## 2025-01-30 ASSESSMENT — PAIN DESCRIPTION - DESCRIPTORS: DESCRIPTORS: PRESSURE

## 2025-01-30 ASSESSMENT — PAIN DESCRIPTION - ORIENTATION: ORIENTATION: LOWER

## 2025-01-30 ASSESSMENT — PAIN DESCRIPTION - LOCATION: LOCATION: BACK

## 2025-01-30 ASSESSMENT — PAIN DESCRIPTION - PAIN TYPE: TYPE: ACUTE PAIN

## 2025-01-30 NOTE — Clinical Note
Javed Gil was seen and treated in our emergency department on 1/30/2025.  He may return to work on 01/31/2025.  The patient is to perform no lifting tasks until 2/14/2025 to allow the patient to rest a lumbar strain sustained at work     If you have any questions or concerns, please don't hesitate to call.      Jorge Saleh, APRN-CNP

## 2025-01-30 NOTE — ED PROVIDER NOTES
Chief Complaint   Patient presents with    Back Pain       44-year-old male arrives to the emergency department chief complaint of bilateral low back pain.  The patient states that 1 week ago he was at work, lifted something heavy, and had an abrupt onset of lower back pain, over the next couple of days the patient states that the pain improved, on Wednesday the patient was at work, lifting a heavy box, states that he had an abrupt onset of repeat of the same low back pain.  Patient endorses bilateral low back pain that is worse with weightbearing and movement, denies any radiculopathy.  The patient denies any saddle paresthesias, denies any urinary or fecal incontinence, endorses a 9 out of 10 pain with specific positions and movements.  The patient does ambulate with a steady gait in the emergency department      History provided by:  Patient   used: No         PmHx, PsHx, Allergies, Family Hx, social Hx reviewed as documented    Given the focused nature of the complaint, only related components review of systems were evaluated, and abnormalities indicated in HPI    Physical Exam:    General: Patient is AAOx3, appears well developed, well nourished, is a good historian, answers questions appropriately    HEENT: head normocephalic, atraumatic, PERRLA, EOMs intact, oropharynx without erythema or exudate, buccal mucosa intact without lesions, TMs unremarkable, nose is patent bilateral    Pulmonary: CTAB, no accessory muscle use, able to speak full clear sentences    Cardiac: HRRR, no murmurs, rubs or gallops    GI: soft, non-tender, non-distended    Musculoskeletal: Bilateral low back pain worse with palpation and movement per HPI, otherwise patient full weight bearing, BRITO, no joint effusions, clubbing or edema noted    Skin: intact, no lesions or rashes noted, turgor is good.    Medical Decision Making  This patient was seen in the emergency department with an attending physician available at  all times throughout their ED course    Primary consideration for this patient given his presentation as well as events prior to arrival would be an acute lumbar strain likely sustained 1 week ago and reinjured yesterday.  The patient's treatment will be geared around inflammation, given the mechanism of injury, no osseous abnormality is likely, through shared decision making no imaging will be done at this time though considered.  Patient given 60 mg of p.o. prednisone, 60 mg of IM Toradol, 60 mg of IM Norflex, as well as a lidocaine patch to the area.    Patient given 5 additional days of prednisone, Zanaflex, ibuprofen and lidocaine patch for outpatient treatment.  If pain persist patient will follow with primary care as needed.  Although considered, patient showing no red flag signs or symptoms of cauda equina or spinal abscess    Patient is amenable to the plan of discharge as outlined above, all patient's questions pertaining to their ED course were answered in their entirety.  Strict return precautions were discussed with the patient and they verbalized understanding.  Further, it was made clear to the patient that from an emergent basis, all effort and testing was done to eliminate any imminent dangerous or potentially dangerous conditions of the patient however if their symptoms get much worse or feel life-threatening, they are to return to the emergency department or call 911 immediately.       Diagnoses as of 01/30/25 1515   Lumbar strain, initial encounter       The patient has had the following imaging during this ER visit: None     Patient History   No past medical history on file.  No past surgical history on file.  No family history on file.  Social History     Tobacco Use    Smoking status: Former     Current packs/day: 0.00     Types: Cigarettes     Quit date: 2015     Years since quitting: 10.0    Smokeless tobacco: Not on file   Substance Use Topics    Alcohol use: Not on file    Drug use: Not on  file       ED Triage Vitals [01/30/25 0820]   Temperature Heart Rate Respirations BP   36.7 °C (98 °F) 72 16 172/89      SpO2 Temp src Heart Rate Source Patient Position   -- -- -- --      BP Location FiO2 (%)     -- --       Vitals:    01/30/25 0820   BP: 172/89   Pulse: 72   Resp: 16   Temp: 36.7 °C (98 °F)   Weight: 99.8 kg (220 lb)   Height: 1.829 m (6')               DONTA Newman-CNP  01/30/25 6993

## (undated) DEVICE — MANIFOLD, 4 PORT NEPTUNE STANDARD

## (undated) DEVICE — BOWL, BASIN, 32 OZ, STERILE

## (undated) DEVICE — COVER, CART, 45 X 27 X 48 IN, CLEAR

## (undated) DEVICE — SYRINGE, 60 CC, LUER LOCK, MONOJECT, W/O CAP, LF

## (undated) DEVICE — STOPCOCK, 4 WAY, LARGE BORE, ROTATING, LUER LOCK, MALE

## (undated) DEVICE — SYRINGE, LUER LOCK, 12ML

## (undated) DEVICE — TRAP, SPECIMEN, 40 ML

## (undated) DEVICE — MARKER, SKIN, RULER AND LABEL PACK, CUSTOM

## (undated) DEVICE — REST, HEAD, BAGEL, 9 IN

## (undated) DEVICE — TRAY, MINOR, SINGLE BASIN, STERILE

## (undated) DEVICE — CUP, SOLUTION

## (undated) DEVICE — DRESSING, NON-ADHERENT, TELFA, OUCHLESS, 3 X 8 IN, STERILE